# Patient Record
Sex: FEMALE | Race: WHITE | NOT HISPANIC OR LATINO | Employment: FULL TIME | URBAN - METROPOLITAN AREA
[De-identification: names, ages, dates, MRNs, and addresses within clinical notes are randomized per-mention and may not be internally consistent; named-entity substitution may affect disease eponyms.]

---

## 2017-01-05 ENCOUNTER — OFFICE VISIT (OUTPATIENT)
Dept: MEDICAL GROUP | Facility: MEDICAL CENTER | Age: 52
End: 2017-01-05
Payer: COMMERCIAL

## 2017-01-05 VITALS
DIASTOLIC BLOOD PRESSURE: 80 MMHG | OXYGEN SATURATION: 100 % | SYSTOLIC BLOOD PRESSURE: 122 MMHG | TEMPERATURE: 97.3 F | HEART RATE: 91 BPM | BODY MASS INDEX: 33.79 KG/M2 | WEIGHT: 179 LBS | HEIGHT: 61 IN | RESPIRATION RATE: 16 BRPM

## 2017-01-05 DIAGNOSIS — E78.5 DYSLIPIDEMIA: ICD-10-CM

## 2017-01-05 DIAGNOSIS — J45.20 MILD INTERMITTENT ASTHMA WITHOUT COMPLICATION: ICD-10-CM

## 2017-01-05 DIAGNOSIS — E03.9 HYPOTHYROIDISM (ACQUIRED): ICD-10-CM

## 2017-01-05 DIAGNOSIS — R92.8 ABNORMAL FINDING ON BREAST IMAGING: ICD-10-CM

## 2017-01-05 DIAGNOSIS — N95.1 PERIMENOPAUSAL: ICD-10-CM

## 2017-01-05 PROBLEM — J45.21 MILD INTERMITTENT ASTHMA WITH ACUTE EXACERBATION: Status: ACTIVE | Noted: 2017-01-05

## 2017-01-05 PROCEDURE — 99204 OFFICE O/P NEW MOD 45 MIN: CPT | Performed by: NURSE PRACTITIONER

## 2017-01-05 RX ORDER — AMOXICILLIN 500 MG
CAPSULE ORAL
Status: ON HOLD | COMMUNITY
End: 2019-10-02

## 2017-01-05 RX ORDER — MONTELUKAST SODIUM 10 MG/1
10 TABLET ORAL DAILY
COMMUNITY
End: 2017-10-11 | Stop reason: SDUPTHER

## 2017-01-05 RX ORDER — CALCIUM CARBONATE 300MG(750)
TABLET,CHEWABLE ORAL
COMMUNITY
End: 2019-09-13

## 2017-01-05 RX ORDER — LEVOTHYROXINE SODIUM 0.1 MG/1
100 TABLET ORAL
COMMUNITY
End: 2017-05-16 | Stop reason: SDUPTHER

## 2017-01-05 ASSESSMENT — PATIENT HEALTH QUESTIONNAIRE - PHQ9: CLINICAL INTERPRETATION OF PHQ2 SCORE: 0

## 2017-01-05 NOTE — Clinical Note
[facility]  MARGIE Ceja  [address]  [place] [address]  Fax: [phone] Authorization for Release/Disclosure of Protected Health Information   Name: ESTER ADEN : 1965 SSN: [id number]   Address: [place] [address] Phone:    [phone] (home) [phone] (work)   I authorize the entity listed below to release/disclose the PHI below to [facility]/YESENIA Ceja.   Provider or Entity Name:  [facility]     Address   City, State, [facility]   Phone:      Fax:     Reason for request: continuity of care   Information to be released:    [  ] LAST COLONOSCOPY, including any PATH REPORT [  ] LAST DEXA  [  ] LAST MAMMOGRAM  [  ] LAST PAP [  ] RETINA EXAM REPORT  [  ] IMMUNIZATION RECORDS  [  ] Release all info      [  ] Check here and initial the line next to each item to release ALL health information INCLUDING  _____ Care and treatment for drug and / or alcohol abuse  _____ HIV testing, infection status, or AIDS  _____ Genetic Testing    DATES OF SERVICE OR TIME PERIOD TO BE DISCLOSED: _____________  I understand and acknowledge that:  * This Authorization may be revoked at any time by you in writing, except if your health information has already been used or disclosed.  * Your health information that will be used or disclosed as a result of you signing this authorization could be re-disclosed by the recipient. If this occurs, your re-disclosed health information may no longer be protected by State or Federal laws.  * You may refuse to sign this Authorization. Your refusal will not affect your ability to obtain treatment.  * This Authorization becomes effective upon signing and will  on (date) __________. If no date is indicated, this Authorization will  one (1) year from the signature date.    Name: Ester Aden    Signature:     Date: 2017

## 2017-01-05 NOTE — MR AVS SNAPSHOT
"        Ester Garnerler   2017 4:20 PM   Office Visit   MRN: [id number]    Department:  [facility]   Dept Phone:  [phone]    Description:  Female : 1965   Provider:  MARGIE Ceja           Reason for Visit     Establish Care           Allergies as of 2017     Not on File      You were diagnosed with     Abnormal finding on breast imaging   [809763]       Hypothyroidism (acquired)   [718273]       Perimenopausal   [186168]       Mild intermittent asthma with acute exacerbation   [761122]       Dyslipidemia   [944981]         Vital Signs     Blood Pressure Pulse Temperature Respirations Height Weight    122/80 mmHg 91 36.3 °C (97.3 °F) 16 1.549 m (5' 0.98\") 81.194 kg (179 lb)    Body Mass Index Oxygen Saturation Smoking Status             33.84 kg/m2 100% Never Smoker          Basic Information     Date Of Birth Sex Race Ethnicity Preferred Language    1965 Female White Non- English      Problem List              ICD-10-CM Priority Class Noted - Resolved    Hypothyroidism (acquired) E03.9   2017 - Present    Mild intermittent asthma with acute exacerbation J45.21   2017 - Present      Health Maintenance        Date Due Completion Dates    IMM DTaP/Tdap/Td Vaccine (1 - Tdap) 1983 ---    PAP SMEAR 1985 ---    COLONOSCOPY 2014 ---    IMM INFLUENZA (1) 2016 ---    MAMMOGRAM 2016, 4/10/2014, 4/10/2014, 3/1/2013, 2012, 2011, 2008, 2008, 8/10/2007, 8/10/2007, 2006, 3/25/2005, 3/15/2004            Current Immunizations     No immunizations on file.      Below and/or attached are the medications your provider expects you to take. Review all of your home medications and newly ordered medications with your provider and/or pharmacist. Follow medication instructions as directed by your provider and/or pharmacist. Please keep your medication list with you and share with your provider. Update the information when " medications are discontinued, doses are changed, or new medications (including over-the-counter products) are added; and carry medication information at all times in the event of emergency situations     Allergies:  (Not on file)          Medications  Valid as of: January 05, 2017 -  5:25 PM    Generic Name Brand Name Tablet Size Instructions for use    Calcium Carb-Cholecalciferol   Take  by mouth.        Levothyroxine Sodium (Tab) SYNTHROID 100 MCG Take 100 mcg by mouth Every morning on an empty stomach.        Magnesium (Tab) Magnesium 400 MG Take  by mouth.        Montelukast Sodium (Tab) SINGULAIR 10 MG Take 10 mg by mouth every day.        Multiple Vitamins-Minerals   Take  by mouth.        Omega-3 Fatty Acids (Cap) Fish Oil 1200 MG Take  by mouth.        .                 Medicines prescribed today were sent to:     None      Medication refill instructions:       If your prescription bottle indicates you have medication refills left, it is not necessary to call your provider’s office. Please contact your pharmacy and they will refill your medication.    If your prescription bottle indicates you do not have any refills left, you may request refills at any time through one of the following ways: The online [email or web address] system (except Urgent Care), by calling your provider’s office, or by asking your pharmacy to contact your provider’s office with a refill request. Medication refills are processed only during regular business hours and may not be available until the next business day. Your provider may request additional information or to have a follow-up visit with you prior to refilling your medication.   *Please Note: Medication refills are assigned a new Rx number when refilled electronically. Your pharmacy may indicate that no refills were authorized even though a new prescription for the same medication is available at the pharmacy. Please request the medicine by name with the pharmacy before contacting your  provider for a refill.        Your To Do List     Future Labs/Procedures Complete By Expires    COMP METABOLIC PANEL  As directed 1/6/2018    FREE THYROXINE  As directed 1/6/2018    LIPID PROFILE  As directed 1/6/2018    MA-DIAGNOSTIC MAMMO W/CAD-BILAT  As directed 1/5/2018    THYROID PEROXIDASE  (TPO) AB  As directed 1/6/2018    TRIIDOTHYRONINE  As directed 1/5/2018    TSH  As directed 1/6/2018    US-BREAST COMPLETE-RIGHT  As directed 1/5/2018         [employer] Access Code: Activation code not generated  Current [employer] Status: Active

## 2017-01-05 NOTE — Clinical Note
[facility]  MARGIE Ceja  [address]  [place] [address]  Fax: [phone] Authorization for Release/Disclosure of Protected Health Information   Name: ESTER SERRANO : 1965 SSN: [id number]   Address: [place] [id number] Phone:    [phone] (home) [phone] (work)   I authorize the entity listed below to release/disclose the PHI below to [facility]/YESENIA Ceja.   Provider or Entity Name: Elin Gorman/ Dr Mccarthy/ Marlyn Bryant       Address   City, [facility] Phone:      Fax:     Reason for request: continuity of care   Information to be released:    [  ] LAST COLONOSCOPY, including any PATH REPORT [  ] LAST DEXA  [  ] LAST MAMMOGRAM  [  ] LAST PAP [  ] RETINA EXAM REPORT  [  ] IMMUNIZATION RECORDS  [  ] Release all info      [  ] Check here and initial the line next to each item to release ALL health information INCLUDING  _____ Care and treatment for drug and / or alcohol abuse  _____ HIV testing, infection status, or AIDS  _____ Genetic Testing    DATES OF SERVICE OR TIME PERIOD TO BE DISCLOSED: _____________  I understand and acknowledge that:  * This Authorization may be revoked at any time by you in writing, except if your health information has already been used or disclosed.  * Your health information that will be used or disclosed as a result of you signing this authorization could be re-disclosed by the recipient. If this occurs, your re-disclosed health information may no longer be protected by State or Federal laws.  * You may refuse to sign this Authorization. Your refusal will not affect your ability to obtain treatment.  * This Authorization becomes effective upon signing and will  on (date) __________. If no date is indicated, this Authorization will  one (1) year from the signature date.    Name: Ester Serrano    Signature:     Date: 2017

## 2017-01-06 PROBLEM — E78.5 DYSLIPIDEMIA: Status: ACTIVE | Noted: 2017-01-06

## 2017-01-06 PROBLEM — N95.1 PERIMENOPAUSAL: Status: ACTIVE | Noted: 2017-01-06

## 2017-01-06 PROBLEM — J45.20 MILD INTERMITTENT ASTHMA WITHOUT COMPLICATION: Status: ACTIVE | Noted: 2017-01-05

## 2017-01-06 NOTE — ASSESSMENT & PLAN NOTE
Menses about every 2-3 weeks  Moderate bleeding, no significant dysmenorrhea  She is having difficulty with mood swings, tearfulness, irritability

## 2017-01-06 NOTE — ASSESSMENT & PLAN NOTE
Diagnosed 20 years ago  Managed with levothyroxine 100 mcg daily  About 30 lb weight gain in the last few months with no change in diet or exercise  Having some mood swings, lower mood than normal

## 2017-01-13 ENCOUNTER — HOSPITAL ENCOUNTER (OUTPATIENT)
Dept: LAB | Facility: MEDICAL CENTER | Age: 52
End: 2017-01-13
Attending: NURSE PRACTITIONER
Payer: COMMERCIAL

## 2017-01-13 DIAGNOSIS — E78.5 DYSLIPIDEMIA: ICD-10-CM

## 2017-01-13 DIAGNOSIS — E03.9 HYPOTHYROIDISM (ACQUIRED): ICD-10-CM

## 2017-01-13 LAB
ALBUMIN SERPL BCP-MCNC: 4.2 G/DL (ref 3.2–4.9)
ALBUMIN/GLOB SERPL: 1.3 G/DL
ALP SERPL-CCNC: 44 U/L (ref 30–99)
ALT SERPL-CCNC: 18 U/L (ref 2–50)
ANION GAP SERPL CALC-SCNC: 9 MMOL/L (ref 0–11.9)
AST SERPL-CCNC: 18 U/L (ref 12–45)
BILIRUB SERPL-MCNC: 0.6 MG/DL (ref 0.1–1.5)
BUN SERPL-MCNC: 9 MG/DL (ref 8–22)
CALCIUM SERPL-MCNC: 9.2 MG/DL (ref 8.5–10.5)
CHLORIDE SERPL-SCNC: 105 MMOL/L (ref 96–112)
CHOLEST SERPL-MCNC: 213 MG/DL (ref 100–199)
CO2 SERPL-SCNC: 25 MMOL/L (ref 20–33)
CREAT SERPL-MCNC: 0.8 MG/DL (ref 0.5–1.4)
GLOBULIN SER CALC-MCNC: 3.3 G/DL (ref 1.9–3.5)
GLUCOSE SERPL-MCNC: 82 MG/DL (ref 65–99)
HDLC SERPL-MCNC: 66 MG/DL
LDLC SERPL CALC-MCNC: 122 MG/DL
POTASSIUM SERPL-SCNC: 3.8 MMOL/L (ref 3.6–5.5)
PROT SERPL-MCNC: 7.5 G/DL (ref 6–8.2)
SODIUM SERPL-SCNC: 139 MMOL/L (ref 135–145)
T3 SERPL-MCNC: 100.9 NG/DL (ref 60–181)
T4 FREE SERPL-MCNC: 1.04 NG/DL (ref 0.53–1.43)
THYROPEROXIDASE AB SERPL-ACNC: 95.4 IU/ML (ref 0–9)
TRIGL SERPL-MCNC: 125 MG/DL (ref 0–149)
TSH SERPL DL<=0.005 MIU/L-ACNC: 2.13 UIU/ML (ref 0.3–3.7)

## 2017-01-13 PROCEDURE — 84439 ASSAY OF FREE THYROXINE: CPT

## 2017-01-13 PROCEDURE — 86376 MICROSOMAL ANTIBODY EACH: CPT

## 2017-01-13 PROCEDURE — 84480 ASSAY TRIIODOTHYRONINE (T3): CPT

## 2017-01-13 PROCEDURE — 84443 ASSAY THYROID STIM HORMONE: CPT

## 2017-01-13 PROCEDURE — 80061 LIPID PANEL: CPT

## 2017-01-13 PROCEDURE — 36415 COLL VENOUS BLD VENIPUNCTURE: CPT

## 2017-01-13 PROCEDURE — 80053 COMPREHEN METABOLIC PANEL: CPT

## 2017-02-10 ENCOUNTER — HOSPITAL ENCOUNTER (OUTPATIENT)
Dept: RADIOLOGY | Facility: MEDICAL CENTER | Age: 52
End: 2017-02-10
Attending: NURSE PRACTITIONER
Payer: COMMERCIAL

## 2017-02-10 DIAGNOSIS — R92.8 ABNORMAL FINDING ON BREAST IMAGING: ICD-10-CM

## 2017-02-10 PROCEDURE — 77063 BREAST TOMOSYNTHESIS BI: CPT

## 2017-05-15 ENCOUNTER — PATIENT MESSAGE (OUTPATIENT)
Dept: MEDICAL GROUP | Facility: MEDICAL CENTER | Age: 52
End: 2017-05-15

## 2017-05-15 DIAGNOSIS — R92.8 ABNORMAL FINDING ON BREAST IMAGING: ICD-10-CM

## 2017-05-16 RX ORDER — LEVOTHYROXINE SODIUM 0.1 MG/1
100 TABLET ORAL
Qty: 90 TAB | Refills: 3 | Status: SHIPPED | OUTPATIENT
Start: 2017-05-16 | End: 2018-05-11 | Stop reason: SDUPTHER

## 2017-05-16 NOTE — TELEPHONE ENCOUNTER
From: Ester Aden  To: MARGIE Ceja  Sent: 5/15/2017 5:59 AM PDT  Subject: Prescription Question    Dr. Kate,    As I thought in January I had about 6 months of the Synthroid 100mcg tabs/or generic medication left. I am now need a refill. Would you please call in an order.     My mail order pharmacy is [facility] [phone].    Thank you,  Ester Aden

## 2017-10-11 ENCOUNTER — PATIENT MESSAGE (OUTPATIENT)
Dept: MEDICAL GROUP | Facility: MEDICAL CENTER | Age: 52
End: 2017-10-11

## 2017-10-11 DIAGNOSIS — R92.8 ABNORMAL FINDING ON BREAST IMAGING: ICD-10-CM

## 2017-10-11 RX ORDER — MONTELUKAST SODIUM 10 MG/1
10 TABLET ORAL DAILY
Qty: 90 TAB | Refills: 3 | Status: SHIPPED | OUTPATIENT
Start: 2017-10-11 | End: 2018-10-07 | Stop reason: SDUPTHER

## 2017-10-11 NOTE — TELEPHONE ENCOUNTER
From: Ester Aden  To: MARGIE Ceja  Sent: 10/11/2017 6:29 AM PDT  Subject: Prescription Question    Good morning Isha,    Would you please refill through [facility] my prescription of Singulair/Montelukast Sod Tabs 10mg.     I didn't ask for a refill last January because I had several months supply which I now have 2 weeks left.     Thanks,  Ester Aden

## 2018-01-09 ENCOUNTER — HOSPITAL ENCOUNTER (OUTPATIENT)
Facility: MEDICAL CENTER | Age: 53
End: 2018-01-09
Attending: NURSE PRACTITIONER
Payer: COMMERCIAL

## 2018-01-09 PROCEDURE — 87624 HPV HI-RISK TYP POOLED RSLT: CPT

## 2018-01-09 PROCEDURE — 88175 CYTOPATH C/V AUTO FLUID REDO: CPT

## 2018-01-12 LAB
CYTOLOGY REG CYTOL: NORMAL
HPV HR 12 DNA CVX QL NAA+PROBE: NEGATIVE
HPV16 DNA SPEC QL NAA+PROBE: NEGATIVE
HPV18 DNA SPEC QL NAA+PROBE: NEGATIVE
SPECIMEN SOURCE: NORMAL

## 2018-01-26 ENCOUNTER — HOSPITAL ENCOUNTER (OUTPATIENT)
Dept: RADIOLOGY | Facility: MEDICAL CENTER | Age: 53
End: 2018-01-26
Attending: NURSE PRACTITIONER
Payer: COMMERCIAL

## 2018-01-26 DIAGNOSIS — N85.2 HYPERTROPHY OF UTERUS: ICD-10-CM

## 2018-01-26 DIAGNOSIS — D25.9 UTERINE LEIOMYOMA, UNSPECIFIED LOCATION: ICD-10-CM

## 2018-01-26 DIAGNOSIS — N92.0 EXCESSIVE OR FREQUENT MENSTRUATION: ICD-10-CM

## 2018-01-26 PROCEDURE — 76830 TRANSVAGINAL US NON-OB: CPT

## 2018-02-02 ENCOUNTER — OFFICE VISIT (OUTPATIENT)
Dept: MEDICAL GROUP | Facility: MEDICAL CENTER | Age: 53
End: 2018-02-02
Payer: COMMERCIAL

## 2018-02-02 VITALS
DIASTOLIC BLOOD PRESSURE: 90 MMHG | BODY MASS INDEX: 35.12 KG/M2 | SYSTOLIC BLOOD PRESSURE: 128 MMHG | OXYGEN SATURATION: 97 % | WEIGHT: 186 LBS | RESPIRATION RATE: 16 BRPM | HEART RATE: 80 BPM | HEIGHT: 61 IN | TEMPERATURE: 97.8 F

## 2018-02-02 DIAGNOSIS — J45.20 MILD INTERMITTENT ASTHMA WITHOUT COMPLICATION: ICD-10-CM

## 2018-02-02 DIAGNOSIS — Z12.31 ENCOUNTER FOR SCREENING MAMMOGRAM FOR BREAST CANCER: ICD-10-CM

## 2018-02-02 DIAGNOSIS — D25.9 UTERINE LEIOMYOMA, UNSPECIFIED LOCATION: ICD-10-CM

## 2018-02-02 DIAGNOSIS — E66.9 OBESITY (BMI 30-39.9): ICD-10-CM

## 2018-02-02 DIAGNOSIS — Z00.00 ANNUAL PHYSICAL EXAM: ICD-10-CM

## 2018-02-02 DIAGNOSIS — E06.3 HYPOTHYROIDISM DUE TO HASHIMOTO'S THYROIDITIS: ICD-10-CM

## 2018-02-02 DIAGNOSIS — E03.8 HYPOTHYROIDISM DUE TO HASHIMOTO'S THYROIDITIS: ICD-10-CM

## 2018-02-02 DIAGNOSIS — E78.00 PURE HYPERCHOLESTEROLEMIA: ICD-10-CM

## 2018-02-02 DIAGNOSIS — R06.83 SNORING: ICD-10-CM

## 2018-02-02 PROBLEM — E78.5 DYSLIPIDEMIA: Status: RESOLVED | Noted: 2017-01-06 | Resolved: 2018-02-02

## 2018-02-02 PROBLEM — E03.9 HYPOTHYROIDISM (ACQUIRED): Status: RESOLVED | Noted: 2017-01-05 | Resolved: 2018-02-02

## 2018-02-02 PROCEDURE — 99396 PREV VISIT EST AGE 40-64: CPT | Mod: 25 | Performed by: NURSE PRACTITIONER

## 2018-02-02 PROCEDURE — 90732 PPSV23 VACC 2 YRS+ SUBQ/IM: CPT | Performed by: NURSE PRACTITIONER

## 2018-02-02 PROCEDURE — 90471 IMMUNIZATION ADMIN: CPT | Performed by: NURSE PRACTITIONER

## 2018-02-02 RX ORDER — CHOLECALCIFEROL (VITAMIN D3) 25 MCG
TABLET,CHEWABLE ORAL
COMMUNITY
End: 2019-09-13

## 2018-02-02 ASSESSMENT — PATIENT HEALTH QUESTIONNAIRE - PHQ9: CLINICAL INTERPRETATION OF PHQ2 SCORE: 0

## 2018-02-02 NOTE — ASSESSMENT & PLAN NOTE
Managed with levothyroxine 100 mcg daily  Gaining weight in the last year  Energy level is ok  Some occ constipation, hair loss, dry skin

## 2018-02-02 NOTE — ASSESSMENT & PLAN NOTE
Gradual weight gain over the last year despite regular exercise. Interested in referral to weight loss program
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
## 2018-02-02 NOTE — ASSESSMENT & PLAN NOTE
"Significant snoring reported by , pauses in breathing, frequent waking  Fatigued during the day, sometimes feeling \"foggy\"  "

## 2018-02-02 NOTE — ASSESSMENT & PLAN NOTE
Uterine enlarged on recent screening test for work. Was seen by her ob/gyn for pap, they ordered an ultrasound which showed multiple thyroid

## 2018-02-08 ENCOUNTER — PATIENT MESSAGE (OUTPATIENT)
Dept: MEDICAL GROUP | Facility: MEDICAL CENTER | Age: 53
End: 2018-02-08

## 2018-02-08 DIAGNOSIS — Z13.21 ENCOUNTER FOR VITAMIN DEFICIENCY SCREENING: ICD-10-CM

## 2018-02-08 NOTE — TELEPHONE ENCOUNTER
From: Ester Aden  To: MARGIE Ceja  Sent: 2/8/2018 10:53 AM PST  Subject: Test Result Question    Deshaun Vieira,    I guess it's not a test result question but an addition question.     I was talking with coworkers about Vitamin D and they asked if I've had my levels tested. Would it be possible to add that to my labs? I am having the thyroid ultrasound on Monday and plan to do my labs then too.     Also, I confirmed with [facility] that I had a Tdap vaccination on 10/17/2011. How do I log that into [employer]?    Thank you,  Ester Aden

## 2018-02-12 ENCOUNTER — HOSPITAL ENCOUNTER (OUTPATIENT)
Dept: RADIOLOGY | Facility: MEDICAL CENTER | Age: 53
End: 2018-02-12
Attending: NURSE PRACTITIONER
Payer: COMMERCIAL

## 2018-02-12 ENCOUNTER — HOSPITAL ENCOUNTER (OUTPATIENT)
Dept: LAB | Facility: MEDICAL CENTER | Age: 53
End: 2018-02-12
Attending: NURSE PRACTITIONER
Payer: COMMERCIAL

## 2018-02-12 DIAGNOSIS — E78.00 PURE HYPERCHOLESTEROLEMIA: ICD-10-CM

## 2018-02-12 DIAGNOSIS — E06.3 HYPOTHYROIDISM DUE TO HASHIMOTO'S THYROIDITIS: ICD-10-CM

## 2018-02-12 DIAGNOSIS — E03.8 HYPOTHYROIDISM DUE TO HASHIMOTO'S THYROIDITIS: ICD-10-CM

## 2018-02-12 DIAGNOSIS — Z13.21 ENCOUNTER FOR VITAMIN DEFICIENCY SCREENING: ICD-10-CM

## 2018-02-12 DIAGNOSIS — Z00.00 ANNUAL PHYSICAL EXAM: ICD-10-CM

## 2018-02-12 LAB
25(OH)D3 SERPL-MCNC: 20 NG/ML (ref 30–100)
ALBUMIN SERPL BCP-MCNC: 4 G/DL (ref 3.2–4.9)
ALBUMIN/GLOB SERPL: 1.4 G/DL
ALP SERPL-CCNC: 48 U/L (ref 30–99)
ALT SERPL-CCNC: 23 U/L (ref 2–50)
ANION GAP SERPL CALC-SCNC: 9 MMOL/L (ref 0–11.9)
AST SERPL-CCNC: 23 U/L (ref 12–45)
BILIRUB SERPL-MCNC: 0.5 MG/DL (ref 0.1–1.5)
BUN SERPL-MCNC: 11 MG/DL (ref 8–22)
CALCIUM SERPL-MCNC: 9 MG/DL (ref 8.5–10.5)
CHLORIDE SERPL-SCNC: 104 MMOL/L (ref 96–112)
CHOLEST SERPL-MCNC: 191 MG/DL (ref 100–199)
CO2 SERPL-SCNC: 25 MMOL/L (ref 20–33)
CREAT SERPL-MCNC: 0.9 MG/DL (ref 0.5–1.4)
GLOBULIN SER CALC-MCNC: 2.9 G/DL (ref 1.9–3.5)
GLUCOSE SERPL-MCNC: 101 MG/DL (ref 65–99)
HDLC SERPL-MCNC: 58 MG/DL
LDLC SERPL CALC-MCNC: 109 MG/DL
POTASSIUM SERPL-SCNC: 3.9 MMOL/L (ref 3.6–5.5)
PROT SERPL-MCNC: 6.9 G/DL (ref 6–8.2)
SODIUM SERPL-SCNC: 138 MMOL/L (ref 135–145)
T3 SERPL-MCNC: 90.5 NG/DL (ref 60–181)
T4 FREE SERPL-MCNC: 1.03 NG/DL (ref 0.53–1.43)
THYROPEROXIDASE AB SERPL-ACNC: 70.3 IU/ML (ref 0–9)
TRIGL SERPL-MCNC: 121 MG/DL (ref 0–149)
TSH SERPL DL<=0.005 MIU/L-ACNC: 0.76 UIU/ML (ref 0.38–5.33)

## 2018-02-12 PROCEDURE — 84480 ASSAY TRIIODOTHYRONINE (T3): CPT

## 2018-02-12 PROCEDURE — 36415 COLL VENOUS BLD VENIPUNCTURE: CPT

## 2018-02-12 PROCEDURE — 86376 MICROSOMAL ANTIBODY EACH: CPT

## 2018-02-12 PROCEDURE — 80061 LIPID PANEL: CPT

## 2018-02-12 PROCEDURE — 84439 ASSAY OF FREE THYROXINE: CPT

## 2018-02-12 PROCEDURE — 76536 US EXAM OF HEAD AND NECK: CPT

## 2018-02-12 PROCEDURE — 80053 COMPREHEN METABOLIC PANEL: CPT

## 2018-02-12 PROCEDURE — 82306 VITAMIN D 25 HYDROXY: CPT

## 2018-02-12 PROCEDURE — 84482 T3 REVERSE: CPT

## 2018-02-12 PROCEDURE — 84443 ASSAY THYROID STIM HORMONE: CPT

## 2018-02-14 ENCOUNTER — PATIENT MESSAGE (OUTPATIENT)
Dept: MEDICAL GROUP | Facility: MEDICAL CENTER | Age: 53
End: 2018-02-14

## 2018-02-14 NOTE — TELEPHONE ENCOUNTER
From: Ester Aden  To: MARGIE Ceja  Sent: 2/14/2018 10:52 AM PST  Subject: Test Result Question    Perfect I will  some vitamin D3.     I'm more concerned about food sensitivities associated with the Hashimotos. There are too many things out there saying to avoid certain foods and I just want to either be tested for allergies/sensitivities or to know what to avoid. I am going gluten free. So that should help.     Ester Aden   ----- Message -----  From: MARGIE Ceja  Sent: 2/14/18, 10:39 AM  To: Ester Aden  Subject: RE: Test Result Question    Here is the phone number for the weight management program:  [facility]  [phone]  However, there is a note by the referral department that this is not a covered benefit with your insurance. I'm not sure what they discharge but you could get more information by calling them.  Patient on 2000 international units of vitamin D3 to what you are already taking  I would defer to your OB/GYN regarding the hormonal concern.  What is the food concern?  Isha HANDY      ----- Message -----   From: Ester Aden   Sent: 2/14/2018 10:25 AM PST   To: MARGIE Ceja  Subject: Test Result Question    I have a question about VITAMIN D,25 HYDROXY resulted on 2/12/18, 1:58 PM.    I am only taking what is included in my calcium, 800 IU I believe.     What additional should I be taking?    Thank you! The rest of the test results look good. My TSH went way down even though still normal.     I will be doing the sleep study March 1st.     I haven't heard from the weight management yet.     I'm wondering if I should have my hormones checked? And or possible food sensitivities.     Have a great day,  Ester Aden

## 2018-02-15 LAB — T3REVERSE SERPL-MCNC: 19.6 NG/DL (ref 9–27)

## 2018-02-16 ENCOUNTER — HOSPITAL ENCOUNTER (OUTPATIENT)
Dept: RADIOLOGY | Facility: MEDICAL CENTER | Age: 53
End: 2018-02-16
Attending: NURSE PRACTITIONER
Payer: COMMERCIAL

## 2018-02-16 DIAGNOSIS — Z12.31 SCREENING MAMMOGRAM, ENCOUNTER FOR: ICD-10-CM

## 2018-02-16 PROCEDURE — 77063 BREAST TOMOSYNTHESIS BI: CPT

## 2018-03-06 ENCOUNTER — PATIENT MESSAGE (OUTPATIENT)
Dept: MEDICAL GROUP | Facility: MEDICAL CENTER | Age: 53
End: 2018-03-06

## 2018-03-13 ENCOUNTER — NON-PROVIDER VISIT (OUTPATIENT)
Dept: HEALTH INFORMATION MANAGEMENT | Facility: MEDICAL CENTER | Age: 53
End: 2018-03-13
Payer: COMMERCIAL

## 2018-03-13 VITALS
BODY MASS INDEX: 32.51 KG/M2 | HEART RATE: 64 BPM | HEIGHT: 61 IN | SYSTOLIC BLOOD PRESSURE: 112 MMHG | TEMPERATURE: 99.5 F | WEIGHT: 172.2 LBS | OXYGEN SATURATION: 98 % | DIASTOLIC BLOOD PRESSURE: 70 MMHG

## 2018-03-13 DIAGNOSIS — E66.9 OBESITY (BMI 30-39.9): ICD-10-CM

## 2018-03-13 DIAGNOSIS — E78.00 PURE HYPERCHOLESTEROLEMIA: ICD-10-CM

## 2018-03-13 DIAGNOSIS — E06.3 HYPOTHYROIDISM DUE TO HASHIMOTO'S THYROIDITIS: ICD-10-CM

## 2018-03-13 DIAGNOSIS — E03.8 HYPOTHYROIDISM DUE TO HASHIMOTO'S THYROIDITIS: ICD-10-CM

## 2018-03-13 PROCEDURE — 99205 OFFICE O/P NEW HI 60 MIN: CPT | Mod: 25 | Performed by: INTERNAL MEDICINE

## 2018-03-13 PROCEDURE — 93000 ELECTROCARDIOGRAM COMPLETE: CPT | Performed by: INTERNAL MEDICINE

## 2018-03-13 PROCEDURE — 97802 MEDICAL NUTRITION INDIV IN: CPT | Performed by: DIETITIAN, REGISTERED

## 2018-03-13 NOTE — PATIENT INSTRUCTIONS
Track intake with My Fitness Pal  Keep kcal < 1400 per day to start  Keep total carb intake < 100 g per day, keep total protein > 100 g per day  64+ oz water per day  Keep current exercise 3 days per week at gym

## 2018-03-13 NOTE — PROGRESS NOTES
Bariatric Medicine H&P  Chief Complaint   Patient presents with   • Weight Gain       Referred by:  Marylou Kate A.P.R.N.    History of Present Illness:   Ester Aden is a 52 y.o.  female who presents for weight management and to help address co-morbidities related to overweight, including hypercholesterolemia, vitamin D deficiency.    The patient presents frustrated by ongoing obesity. Several years ago, she gained 30 pounds and has not been able to lose it. She was diagnosed with Hashimoto's thyroiditis, has had her thyroid disease managed by her primary care provider, has been stable. She was told avoiding gluten may be helpful to her thyroiditis, does not know what to eat and what foods to avoid.    She tried Jessica Mirza many years ago, had gradual loss, has not tried a formal weight loss program since. Does not like package foods and is not interested in protein shake meal replacements at this time. Wants to make changes using whole food. Not interested in weight loss medications at this time. Is willing to track intake.    Current intake includes a protein shake for breakfast during the week which she makes or cereal or oatmeal. Her protein smoothie includes spinach, a cup of almond milk, 20 g of protein powder, tropical fruit one cup with some berries. For lunch, she has a salad, meat and cheese with chips and fruit or leftovers, dinner of beef or chicken with potato and vegetable. She snacks a lot on crackers, chips, dried fruit, Nutella before dinner, yogurt, popcorn. She mostly drinks coffee, tea, and water. Drinks at least 64+ ounces of water per day. She mostly eats at her desk or in front of the TV at home.    Is on vitamin D repletion. Not requiring a statin at this time.      Behavior-Related History:  Binge eating screen: Negative       Exercise:   Joined a local gym in December, takes 2 group classes per week, one day free gym time  Stands at her desk 80% of the time    Life Style  "Changes:  Moved to [place] from [place] last year, still commutes to [place] for work     Review of Systems   Fatigue, constipation, sleepy during waking hours at times and insomnia.  Sleep apnea screen: Negative, does snore but had negative home sleep study last month  All other ROS were reviewed with patient today and are negative.      PMH/PSH:  I have reviewed the patient's medical, social and family history, allergies, and medications today.  Prior records reviewed.  FHx Obesity: Positive  Personal Hx of Bariatric Surgery: No  Works administrative desk job for the [employer]      Physical Exam:   /70   Pulse 64   Temp 37.5 °C (99.5 °F)   Ht 1.549 m (5' 1\")   Wt 78.1 kg (172 lb 3.2 oz)   SpO2 98%   BMI 32.54 kg/m²   Waist: 37.5 in  Body fat % 43  REE 1480 kcal/day    Constitutional: Oriented to person, place, and time and well-developed, well-nourished, and in no distress.    HENT: No facial plethora.  No Cushingoid features.  No scalloped tongue.  No dental erosions.  No swollen parotids.  Head: Normocephalic.   Eyes: EOM are normal. Pupils are equal, round, and reactive to light. No periorbital edema.  No lateral thinning of eyebrows.  No vertical nystagmus.  Neck: Normal range of motion. Neck supple. No thyromegaly present. No buffalo hump.  Cardiovascular: Normal rate and regular rhythm.  No murmur heard.  Pulmonary/Chest: Effort normal and breath sounds normal. No wheezes.   Abdominal: Soft. Bowel sounds are normal. No pannus.  No ascites.  No hepatosplenomegaly.  No red striae.  Musculoskeletal: Normal range of motion. No edema.   Neurological: Alert and oriented to person, place, and time. Normal reflexes. No cranial nerve deficit. No muscle weakness.  Gait normal.   Skin: Warm and dry. Not diaphoretic. No hirsuitism.  No acanthosis nigricans.  Not excessively dry, scaly.  No acne.  No bruising/ecchymosis.  No hyperpigmentation.  No xanthomas or acrochordon.  No violaceous striae.  " No keratosis pilaris.  Psychiatric: Mood, memory, affect and judgment normal.  Tearful at times.    Laboratory:   Prior labs reviewed. 2/12/18 glucose 101, , vitamin D 20  EKG: Sinus, rate 57, no significant ST-T abnormalities, corrected QT 0.386  Ordered and reviewed by me today.    Dietitian Assessment: I have reviewed the Dietitian's assessment related to this encounter.       ASSESSMENT/PLAN:  Body mass index is 32.54 kg/m².   Obesity Stage ([place]) 1; Class 1    1. Obesity (BMI 30-39.9)  EKG   2. Pure hypercholesterolemia  EKG   3. Hypothyroidism due to Hashimoto's thyroiditis       The patient will benefit from reducing her calorie and carbohydrate intake. I suspect her calorie intake is too high, compared to her resting energy expenditure. She also snacks quite a bit on refined carbohydrates, which is worsening her cholesterol and is pro-inflammatory in terms of her thyroiditis. I'm not sure the patient has a gluten intolerance based on her history, but reducing refined carbohydrates significantly will reduce her gluten intake markedly.    The patient and I have discussed at length and agree to the following recommendations, which are all addressing the above diagnoses:    Weight Goal: 5% wt loss at one month after start (pt goal weight is 140 lb)  Diet:   Track intake with My Fitness Pal  Keep kcal < 1400 per day to start  Keep total carb intake < 100 g per day, keep total protein > 100 g per day  64+ oz water per day  Reduce contents of protein shake as in line with the above recommendations  Physical Activity: Continue current 3 days per week at the gym  Risk level for moderate/vigorous exercise program: Low  New Rx: None.  Side Effects: Will review consent if applicable.  Behavior change: Mindful eating, tracking  Follow-up: 2 weeks    Face to face time spent 60 minutes,  with >50% of time devoted to one on one counseling on weight management issues, as documented above.      Thank you for your  referral!

## 2018-03-13 NOTE — PROGRESS NOTES
"3/13/2018   Referring Provider: MARGIE Ceja       Time in/out:  9:00-9:30am     Anthropometrics/Objective  Today's weight: 172.2lb  Height: 5'1\"  BMI: 32.54   Stated Goal Weight: 140lb  See comprehensive patient history form for further information     Subjective:  Pt is here today for the initial screening visit for the medical weight management program.   She is using a protein shake most mornings or for lunch.   Shake: 1 scoop protein powder, spinach, 1 cup almond milk , 1 cup frozen fruit   Has used My fitness pal in the past   Exercises 3 days per week after work (group classes)   Weekends are difficult- usually skips a meal on weekends   Pt has been told to follow a gluten free diet but has found it difficult and overwhelming. Especially when cooking for her kids / family.     See Medical Questionnaire for more detailed diet history     Nutrition Diagnosis (PES Statement)  · Obesity related to excessive energy intake and inadequate energy expenditure as evidenced by BMI >30      Client history:  Condition(s) associated with a diagnosis or treatment (specify) Hashimoto's thyroiditis, Hypercholesterolemia     Biochemical data, medical test and procedures  No results found for: HBA1C@  No results found for: POCGLUCOSE  Lab Results   Component Value Date/Time    CHOLSTRLTOT 191 02/12/2018 08:30 AM     (H) 02/12/2018 08:30 AM    HDL 58 02/12/2018 08:30 AM    TRIGLYCERIDE 121 02/12/2018 08:30 AM         Nutrition Intervention  Nutrition Prescription  Recommended Daily Kcals: <1500 Kcal based on REE based on REE of 1480kcal    Recommended Daily Protein: 100-110g based on ~25-30% kcal from protein     Meal Plan Recommendation   Recommend 1 meal replacement (protein shake) with 2 grocery meals and 2 snacks per day    Comprehensive Nutrition Education Instruction or training leading to in-depth nutrition related knowledge about:  Benefits to following meal plan, Combine carb, protein and fat at each " meal, Meal timing and spacing, Metabolism of carb, protein, fat, Portion control/ Plate method, and Carbs to Avoid    Handouts provided regarding topics discussed     Monitoring & Evaluation Plan    Behavioral-Environmental:  Behavior: Keep a food journal and bring to next appointment - My fitness pal     Physical activity: Continue current exercise routine     Food / Nutrient Intake:  Food intake: Follow meal plan as discussed (see above). Avoid concentrated sweets and processed carbs. Choose more whole grains, and natural sugar sources of carbohydrate.  Limit carbs to <100g per day. Follow plate method at meals and limit starch/carb portion to 1/2 cup. Reduce portion of fruit in smoothie to 1/2 cup.     Fluid intake: Consume at least 64 oz water per day. Avoid all unsweetened beverages. Limit coffee to 1 cup a day (ideally no cream or sugar). Avoid alcohol.       Physical Signs / Symptoms:  Weight change : -1-2lb per week to achieve goal weight of 140lb       Assessment Notes:  Pt is currently consuming an imbalanced macronutrient diet. She is consuming a high carb breakfast (cereal w banana) and also often consumes high glycemic carbohydrates (white potatoes, white rice). Today we discussed portion control, macronutrient balance, complex carbs vs simple sugars, and healthy snacking.   Pt will track intake in My Fitness Pal, which will be reviewed at next appt.     Follow up 2 weeks  Alsia Bernard RD, LD, CDE  [phone]

## 2018-04-03 ENCOUNTER — APPOINTMENT (OUTPATIENT)
Dept: HEALTH INFORMATION MANAGEMENT | Facility: MEDICAL CENTER | Age: 53
End: 2018-04-03
Payer: COMMERCIAL

## 2018-07-13 ENCOUNTER — APPOINTMENT (RX ONLY)
Dept: URBAN - METROPOLITAN AREA CLINIC 20 | Facility: CLINIC | Age: 53
Setting detail: DERMATOLOGY
End: 2018-07-13

## 2018-07-13 DIAGNOSIS — D22 MELANOCYTIC NEVI: ICD-10-CM

## 2018-07-13 DIAGNOSIS — L30.4 ERYTHEMA INTERTRIGO: ICD-10-CM

## 2018-07-13 DIAGNOSIS — L82.1 OTHER SEBORRHEIC KERATOSIS: ICD-10-CM

## 2018-07-13 DIAGNOSIS — L57.3 POIKILODERMA OF CIVATTE: ICD-10-CM

## 2018-07-13 DIAGNOSIS — L81.4 OTHER MELANIN HYPERPIGMENTATION: ICD-10-CM

## 2018-07-13 DIAGNOSIS — Z12.83 ENCOUNTER FOR SCREENING FOR MALIGNANT NEOPLASM OF SKIN: ICD-10-CM

## 2018-07-13 DIAGNOSIS — D485 NEOPLASM OF UNCERTAIN BEHAVIOR OF SKIN: ICD-10-CM

## 2018-07-13 PROBLEM — D22.71 MELANOCYTIC NEVI OF RIGHT LOWER LIMB, INCLUDING HIP: Status: ACTIVE | Noted: 2018-07-13

## 2018-07-13 PROBLEM — E03.9 HYPOTHYROIDISM, UNSPECIFIED: Status: ACTIVE | Noted: 2018-07-13

## 2018-07-13 PROBLEM — D22.5 MELANOCYTIC NEVI OF TRUNK: Status: ACTIVE | Noted: 2018-07-13

## 2018-07-13 PROBLEM — D22.72 MELANOCYTIC NEVI OF LEFT LOWER LIMB, INCLUDING HIP: Status: ACTIVE | Noted: 2018-07-13

## 2018-07-13 PROBLEM — D48.5 NEOPLASM OF UNCERTAIN BEHAVIOR OF SKIN: Status: ACTIVE | Noted: 2018-07-13

## 2018-07-13 PROBLEM — D22.39 MELANOCYTIC NEVI OF OTHER PARTS OF FACE: Status: ACTIVE | Noted: 2018-07-13

## 2018-07-13 PROCEDURE — ? COUNSELING

## 2018-07-13 PROCEDURE — ? BIOPSY BY SHAVE METHOD

## 2018-07-13 PROCEDURE — 99203 OFFICE O/P NEW LOW 30 MIN: CPT | Mod: 25

## 2018-07-13 PROCEDURE — 11100: CPT

## 2018-07-13 ASSESSMENT — LOCATION ZONE DERM
LOCATION ZONE: NECK
LOCATION ZONE: TRUNK
LOCATION ZONE: LEG
LOCATION ZONE: FACE

## 2018-07-13 ASSESSMENT — LOCATION DETAILED DESCRIPTION DERM
LOCATION DETAILED: LEFT BUTTOCK
LOCATION DETAILED: RIGHT MEDIAL BREAST 5-6:00 REGION
LOCATION DETAILED: LEFT FOREHEAD
LOCATION DETAILED: LEFT MEDIAL BREAST 7-8:00 REGION
LOCATION DETAILED: RIGHT DISTAL PRETIBIAL REGION
LOCATION DETAILED: LEFT PROXIMAL PRETIBIAL REGION
LOCATION DETAILED: RIGHT INFERIOR CENTRAL MALAR CHEEK
LOCATION DETAILED: LEFT INFERIOR ANTERIOR NECK
LOCATION DETAILED: RIGHT BUTTOCK
LOCATION DETAILED: RIGHT MID PREAURICULAR CHEEK
LOCATION DETAILED: RIGHT ANTERIOR DISTAL THIGH
LOCATION DETAILED: LEFT INFERIOR CENTRAL MALAR CHEEK

## 2018-07-13 ASSESSMENT — LOCATION SIMPLE DESCRIPTION DERM
LOCATION SIMPLE: LEFT BREAST
LOCATION SIMPLE: RIGHT CHEEK
LOCATION SIMPLE: RIGHT BUTTOCK
LOCATION SIMPLE: LEFT BUTTOCK
LOCATION SIMPLE: RIGHT PRETIBIAL REGION
LOCATION SIMPLE: LEFT ANTERIOR NECK
LOCATION SIMPLE: RIGHT BREAST
LOCATION SIMPLE: LEFT FOREHEAD
LOCATION SIMPLE: LEFT CHEEK
LOCATION SIMPLE: RIGHT THIGH
LOCATION SIMPLE: LEFT PRETIBIAL REGION

## 2018-07-13 NOTE — PROCEDURE: COUNSELING
Detail Level: Generalized
Patient Specific Counseling (Will Not Stick From Patient To Patient): Minimal today.  Recommended OTC Zeasorb AF powder.
Detail Level: Zone
Detail Level: Detailed

## 2018-07-13 NOTE — PROCEDURE: BIOPSY BY SHAVE METHOD
Render Post-Care Instructions In Note?: yes
Notification Instructions: Patient will be notified of biopsy results; however, patient is instructed to call the office if not contacted within 2 weeks.
Size Of Lesion In Cm: 0.7
Dressing: Band-Aid
Detail Level: Detailed
Wound Care: Aquaphor
Bill 08614 For Specimen Handling/Conveyance To Laboratory?: no
Hemostasis: Drysol and Electrocautery
Biopsy Type: H and E
Consent: Written and verbal consent were obtained and risks were reviewed including but not limited to scarring, infection, bleeding, scabbing, incomplete removal, nerve damage and allergy to anesthesia.
Electrodesiccation Text: The wound bed was treated with electrodesiccation after the biopsy was performed.
Depth Of Biopsy: dermis
Electrodesiccation And Curettage Text: The wound bed was treated with electrodesiccation and curettage after the biopsy was performed.
Silver Nitrate Text: The wound bed was treated with silver nitrate after the biopsy was performed.
Lab: 253
Post-Care Instructions: Keep the biopsy site dry overnight, then keep the site clean by washing with soap and water twice daily then covering with Vaseline/Aquaphor and a Band-Aid until healed.
X Size Of Lesion In Cm: 0.5
Cryotherapy Text: The wound bed was treated with cryotherapy after the biopsy was performed.
Curettage Text: The wound bed was treated with curettage after the biopsy was performed.
Billing Type: Third-Party Bill
Anesthesia Type: 1% lidocaine with epinephrine and a 1:10 solution of 8.4% sodium bicarbonate
Lab Facility: 
Additional Anesthesia Volume In Cc (Will Not Render If 0): 0
Biopsy Method: Personna blade
Type Of Destruction Used: Curettage
Anesthesia Volume In Cc: 0.1

## 2019-02-27 ENCOUNTER — HOSPITAL ENCOUNTER (OUTPATIENT)
Dept: RADIOLOGY | Facility: MEDICAL CENTER | Age: 54
End: 2019-02-27
Attending: NURSE PRACTITIONER
Payer: COMMERCIAL

## 2019-02-27 DIAGNOSIS — Z12.31 VISIT FOR SCREENING MAMMOGRAM: ICD-10-CM

## 2019-02-27 PROCEDURE — 77063 BREAST TOMOSYNTHESIS BI: CPT

## 2019-03-28 ENCOUNTER — HOSPITAL ENCOUNTER (OUTPATIENT)
Dept: LAB | Facility: MEDICAL CENTER | Age: 54
End: 2019-03-28
Attending: NURSE PRACTITIONER
Payer: COMMERCIAL

## 2019-03-28 LAB
BASOPHILS # BLD AUTO: 0.7 % (ref 0–1.8)
BASOPHILS # BLD: 0.09 K/UL (ref 0–0.12)
EOSINOPHIL # BLD AUTO: 0.35 K/UL (ref 0–0.51)
EOSINOPHIL NFR BLD: 2.7 % (ref 0–6.9)
ERYTHROCYTE [DISTWIDTH] IN BLOOD BY AUTOMATED COUNT: 51.8 FL (ref 35.9–50)
HCT VFR BLD AUTO: 40.7 % (ref 37–47)
HGB BLD-MCNC: 12.8 G/DL (ref 12–16)
IMM GRANULOCYTES # BLD AUTO: 0.03 K/UL (ref 0–0.11)
IMM GRANULOCYTES NFR BLD AUTO: 0.2 % (ref 0–0.9)
LYMPHOCYTES # BLD AUTO: 3.41 K/UL (ref 1–4.8)
LYMPHOCYTES NFR BLD: 26 % (ref 22–41)
MCH RBC QN AUTO: 30.1 PG (ref 27–33)
MCHC RBC AUTO-ENTMCNC: 31.4 G/DL (ref 33.6–35)
MCV RBC AUTO: 95.8 FL (ref 81.4–97.8)
MONOCYTES # BLD AUTO: 0.69 K/UL (ref 0–0.85)
MONOCYTES NFR BLD AUTO: 5.3 % (ref 0–13.4)
NEUTROPHILS # BLD AUTO: 8.53 K/UL (ref 2–7.15)
NEUTROPHILS NFR BLD: 65.1 % (ref 44–72)
NRBC # BLD AUTO: 0 K/UL
NRBC BLD-RTO: 0 /100 WBC
PLATELET # BLD AUTO: 370 K/UL (ref 164–446)
PMV BLD AUTO: 11.3 FL (ref 9–12.9)
RBC # BLD AUTO: 4.25 M/UL (ref 4.2–5.4)
T4 FREE SERPL-MCNC: 1.13 NG/DL (ref 0.53–1.43)
THYROPEROXIDASE AB SERPL-ACNC: 48.5 IU/ML (ref 0–9)
TSH SERPL DL<=0.005 MIU/L-ACNC: 1.71 UIU/ML (ref 0.38–5.33)
WBC # BLD AUTO: 13.1 K/UL (ref 4.8–10.8)

## 2019-03-28 PROCEDURE — 84443 ASSAY THYROID STIM HORMONE: CPT

## 2019-03-28 PROCEDURE — 86376 MICROSOMAL ANTIBODY EACH: CPT

## 2019-03-28 PROCEDURE — 85025 COMPLETE CBC W/AUTO DIFF WBC: CPT

## 2019-03-28 PROCEDURE — 36415 COLL VENOUS BLD VENIPUNCTURE: CPT

## 2019-03-28 PROCEDURE — 84439 ASSAY OF FREE THYROXINE: CPT

## 2019-04-10 ENCOUNTER — HOSPITAL ENCOUNTER (OUTPATIENT)
Dept: RADIOLOGY | Facility: MEDICAL CENTER | Age: 54
End: 2019-04-10
Attending: NURSE PRACTITIONER
Payer: COMMERCIAL

## 2019-04-10 DIAGNOSIS — N92.4 EXCESSIVE BLEEDING IN PREMENOPAUSAL PERIOD: ICD-10-CM

## 2019-04-10 PROCEDURE — 76830 TRANSVAGINAL US NON-OB: CPT

## 2019-04-18 ENCOUNTER — HOSPITAL ENCOUNTER (OUTPATIENT)
Dept: LAB | Facility: MEDICAL CENTER | Age: 54
End: 2019-04-18
Attending: NURSE PRACTITIONER
Payer: COMMERCIAL

## 2019-04-18 LAB
BASOPHILS # BLD AUTO: 0.8 % (ref 0–1.8)
BASOPHILS # BLD: 0.07 K/UL (ref 0–0.12)
EOSINOPHIL # BLD AUTO: 0.27 K/UL (ref 0–0.51)
EOSINOPHIL NFR BLD: 3.1 % (ref 0–6.9)
ERYTHROCYTE [DISTWIDTH] IN BLOOD BY AUTOMATED COUNT: 50.1 FL (ref 35.9–50)
HCT VFR BLD AUTO: 40.4 % (ref 37–47)
HGB BLD-MCNC: 12.7 G/DL (ref 12–16)
IMM GRANULOCYTES # BLD AUTO: 0.02 K/UL (ref 0–0.11)
IMM GRANULOCYTES NFR BLD AUTO: 0.2 % (ref 0–0.9)
LYMPHOCYTES # BLD AUTO: 3.53 K/UL (ref 1–4.8)
LYMPHOCYTES NFR BLD: 40.5 % (ref 22–41)
MCH RBC QN AUTO: 30.1 PG (ref 27–33)
MCHC RBC AUTO-ENTMCNC: 31.4 G/DL (ref 33.6–35)
MCV RBC AUTO: 95.7 FL (ref 81.4–97.8)
MONOCYTES # BLD AUTO: 0.7 K/UL (ref 0–0.85)
MONOCYTES NFR BLD AUTO: 8 % (ref 0–13.4)
NEUTROPHILS # BLD AUTO: 4.12 K/UL (ref 2–7.15)
NEUTROPHILS NFR BLD: 47.4 % (ref 44–72)
NRBC # BLD AUTO: 0 K/UL
NRBC BLD-RTO: 0 /100 WBC
PLATELET # BLD AUTO: 354 K/UL (ref 164–446)
PMV BLD AUTO: 11.1 FL (ref 9–12.9)
RBC # BLD AUTO: 4.22 M/UL (ref 4.2–5.4)
WBC # BLD AUTO: 8.7 K/UL (ref 4.8–10.8)

## 2019-04-18 PROCEDURE — 85025 COMPLETE CBC W/AUTO DIFF WBC: CPT

## 2019-04-18 PROCEDURE — 36415 COLL VENOUS BLD VENIPUNCTURE: CPT

## 2019-05-12 DIAGNOSIS — R92.8 ABNORMAL FINDING ON BREAST IMAGING: ICD-10-CM

## 2019-05-13 RX ORDER — LEVOTHYROXINE SODIUM 100 MCG
TABLET ORAL
Qty: 90 TAB | Refills: 0 | Status: SHIPPED | OUTPATIENT
Start: 2019-05-13 | End: 2019-09-16 | Stop reason: SDUPTHER

## 2019-05-31 ENCOUNTER — OFFICE VISIT (OUTPATIENT)
Dept: MEDICAL GROUP | Facility: PHYSICIAN GROUP | Age: 54
End: 2019-05-31
Payer: COMMERCIAL

## 2019-05-31 VITALS
OXYGEN SATURATION: 97 % | BODY MASS INDEX: 31.28 KG/M2 | HEART RATE: 72 BPM | RESPIRATION RATE: 16 BRPM | TEMPERATURE: 98.1 F | SYSTOLIC BLOOD PRESSURE: 110 MMHG | WEIGHT: 170 LBS | DIASTOLIC BLOOD PRESSURE: 80 MMHG | HEIGHT: 62 IN

## 2019-05-31 DIAGNOSIS — D25.9 UTERINE LEIOMYOMA, UNSPECIFIED LOCATION: ICD-10-CM

## 2019-05-31 DIAGNOSIS — E03.8 HYPOTHYROIDISM DUE TO HASHIMOTO'S THYROIDITIS: ICD-10-CM

## 2019-05-31 DIAGNOSIS — J45.20 MILD INTERMITTENT ASTHMA WITHOUT COMPLICATION: ICD-10-CM

## 2019-05-31 DIAGNOSIS — E66.9 OBESITY (BMI 30-39.9): ICD-10-CM

## 2019-05-31 DIAGNOSIS — E06.3 HYPOTHYROIDISM DUE TO HASHIMOTO'S THYROIDITIS: ICD-10-CM

## 2019-05-31 PROCEDURE — 99204 OFFICE O/P NEW MOD 45 MIN: CPT | Performed by: INTERNAL MEDICINE

## 2019-05-31 RX ORDER — ALBUTEROL SULFATE 90 UG/1
2 AEROSOL, METERED RESPIRATORY (INHALATION) EVERY 4 HOURS PRN
Qty: 8.5 G | Refills: 3 | Status: ON HOLD | OUTPATIENT
Start: 2019-05-31 | End: 2019-10-02

## 2019-05-31 RX ORDER — ALBUTEROL SULFATE 90 UG/1
2 AEROSOL, METERED RESPIRATORY (INHALATION)
COMMUNITY
Start: 2015-10-05 | End: 2019-05-31 | Stop reason: SDUPTHER

## 2019-05-31 RX ORDER — MONTELUKAST SODIUM 10 MG/1
TABLET ORAL
COMMUNITY
Start: 2016-09-21 | End: 2019-05-30

## 2019-05-31 RX ORDER — LEVOTHYROXINE SODIUM 0.1 MG/1
100 TABLET ORAL
COMMUNITY
Start: 2016-06-21 | End: 2019-05-29

## 2019-05-31 ASSESSMENT — PATIENT HEALTH QUESTIONNAIRE - PHQ9: CLINICAL INTERPRETATION OF PHQ2 SCORE: 0

## 2019-05-31 NOTE — PROGRESS NOTES
PRIMARY CARE CLINIC NEW PATIENT H&P  Chief Complaint   Patient presents with   • Asthma     Inhaler refill    • Hypothyroidism     History of Present Illness     Hypothyroidism due to Hashimoto's thyroiditis  Taking levothyroxine 100 mcg daily. Positive TPO antibodies.     Mild intermittent asthma without complication  Taking singulair, inhaler as needed.     Fibroid, uterine  Has heavy bleeding and pain, in the process of being seen by Dr. Medina.     Obesity (BMI 30-39.9)  Has gained about 30 lbs since jonathan-menopause which she hasn't been able to get off. Has been trying to watch what she eats. Tries not to eat processed foods.     Current Outpatient Prescriptions   Medication Sig Dispense Refill   • albuterol (PROVENTIL HFA) 108 (90 Base) MCG/ACT Aero Soln inhalation aerosol Inhale 2 Puffs by mouth every four hours as needed for Shortness of Breath. 8.5 g 3   • SYNTHROID 100 MCG Tab TAKE 1 TABLET EVERY MORNING ON AN EMPTY STOMACH 90 Tab 0   • montelukast (SINGULAIR) 10 MG Tab TAKE 1 TABLET DAILY 90 Tab 3   • Cyanocobalamin (B-12) 1000 MCG Cap Take  by mouth.     • Multiple Vitamins-Minerals (ONE-A-DAY 50 PLUS PO) Take  by mouth.     • Omega-3 Fatty Acids (FISH OIL) 1200 MG Cap Take  by mouth.     • Calcium Carb-Cholecalciferol (CALCIUM 600 + D PO) Take  by mouth.     • Magnesium 400 MG Tab Take  by mouth.       No current facility-administered medications for this visit.        Past Medical History:   Diagnosis Date   • Asthma    • Thyroid disease      History reviewed. No pertinent surgical history.  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    ROS  Constitutional: Negative for fatigue/generalized weakness.   HEENT: Negative for  vision changes, hearing changes    Respiratory: Negative for shortness of breath  Cardiovascular: Negative for chest pain, palpitations  Gastrointestinal: Negative for blood in stool, constipation, diarrhea  Genitourinary: Negative for dysuria, polyuria  Musculoskeletal: Negative for myalgias, back pain, and joint pain.   Skin: Negative for rash  Neurological: Negative for numbness, tingling  Psychiatric/Behavioral: Negative for depression, anxiety       Objective   /80 (BP Cuff Size: Large adult)   Pulse 72   Temp 36.7 °C (98.1 °F)   Resp 16   Ht 1.575 m (5' 2.01\")   Wt 77.1 kg (170 lb)   SpO2 97%  Body mass index is 31.09 kg/m².    General: Alert, oriented. In no acute distress   HEET: EOMI, PERRL, conjunctiva non-injected, sclera non-icteric.  Nares patent with no significant congestion or drainage.  Gwendolyn pinnae, external auditory canals, TM pearly gray with normal light reflex bilaterally.Oral mucous membranes pink and moist with no lesions.  Neck: supple with no cervical, subclavicular lymphadenopathy, JVD, palpable thyroid nodules   Lungs: clear to auscultation bilaterally with good excursion.  CV: regular rate and rhythm.  Abdomen soft, non-distended, non-tender with normal bowel sounds. No hepatosplenomegaly, no masses palpated  Skin: no lesions. Warm, dry   Psychiatric: appropriate mood and affect     Assessment and Plan   The following treatment plan was discussed     1. Hypothyroidism due to Hashimoto's thyroiditis  TSH at goal on levothyroxine 100 mcg.     2. Mild intermittent asthma without complication  - albuterol (PROVENTIL HFA) 108 (90 Base) MCG/ACT Aero Soln inhalation aerosol; " Inhale 2 Puffs by mouth every four hours as needed for Shortness of Breath.  Dispense: 8.5 g; Refill: 3    3. BMI 31.0-31.9,adult  Discussed more of a plant based diet.   - Patient identified as having weight management issue.  Appropriate orders and counseling given.    4. Uterine leiomyoma, unspecified location  Will follow up with Dr. Medina.     No Follow-up on file.    Health Maintenance      Health Maintenance Due   Topic Date Due   • HEPATITIS C SCREENING  1965       Aneesh Samaniego MD  Internal Medicine  [facility]

## 2019-05-31 NOTE — ASSESSMENT & PLAN NOTE
Has gained about 30 lbs since jonathan-menopause which she hasn't been able to get off. Has been trying to watch what she eats. Tries not to eat processed foods.

## 2019-09-13 DIAGNOSIS — Z01.812 PRE-OPERATIVE LABORATORY EXAMINATION: ICD-10-CM

## 2019-09-13 LAB
ANION GAP SERPL CALC-SCNC: 9 MMOL/L (ref 0–11.9)
BUN SERPL-MCNC: 18 MG/DL (ref 8–22)
CALCIUM SERPL-MCNC: 9.4 MG/DL (ref 8.5–10.5)
CHLORIDE SERPL-SCNC: 102 MMOL/L (ref 96–112)
CO2 SERPL-SCNC: 25 MMOL/L (ref 20–33)
CREAT SERPL-MCNC: 1 MG/DL (ref 0.5–1.4)
ERYTHROCYTE [DISTWIDTH] IN BLOOD BY AUTOMATED COUNT: 48.4 FL (ref 35.9–50)
GLUCOSE SERPL-MCNC: 93 MG/DL (ref 65–99)
HCT VFR BLD AUTO: 40.3 % (ref 37–47)
HGB BLD-MCNC: 13.3 G/DL (ref 12–16)
MCH RBC QN AUTO: 31.2 PG (ref 27–33)
MCHC RBC AUTO-ENTMCNC: 33 G/DL (ref 33.6–35)
MCV RBC AUTO: 94.6 FL (ref 81.4–97.8)
PLATELET # BLD AUTO: 366 K/UL (ref 164–446)
PMV BLD AUTO: 10.5 FL (ref 9–12.9)
POTASSIUM SERPL-SCNC: 4.1 MMOL/L (ref 3.6–5.5)
RBC # BLD AUTO: 4.26 M/UL (ref 4.2–5.4)
SODIUM SERPL-SCNC: 136 MMOL/L (ref 135–145)
WBC # BLD AUTO: 11.8 K/UL (ref 4.8–10.8)

## 2019-09-13 PROCEDURE — 36415 COLL VENOUS BLD VENIPUNCTURE: CPT

## 2019-09-13 PROCEDURE — 80048 BASIC METABOLIC PNL TOTAL CA: CPT

## 2019-09-13 PROCEDURE — 85027 COMPLETE CBC AUTOMATED: CPT

## 2019-09-13 RX ORDER — COVID-19 ANTIGEN TEST
KIT MISCELLANEOUS PRN
Status: ON HOLD | COMMUNITY
End: 2019-10-02

## 2019-09-13 RX ORDER — OMEGA-3 FATTY ACIDS/FISH OIL 300-1000MG
CAPSULE ORAL PRN
Status: ON HOLD | COMMUNITY
End: 2019-10-02

## 2019-09-15 DIAGNOSIS — R92.8 ABNORMAL FINDING ON BREAST IMAGING: ICD-10-CM

## 2019-09-15 RX ORDER — LEVOTHYROXINE SODIUM 0.1 MG/1
TABLET ORAL
Qty: 90 TAB | Refills: 0 | Status: CANCELLED | OUTPATIENT
Start: 2019-09-15

## 2019-09-16 DIAGNOSIS — R92.8 ABNORMAL FINDING ON BREAST IMAGING: ICD-10-CM

## 2019-09-16 NOTE — TELEPHONE ENCOUNTER
No pharmacy on file for her refill, can we please clarify with her where she wants her synthroid sent?

## 2019-09-17 RX ORDER — LEVOTHYROXINE SODIUM 0.1 MG/1
TABLET ORAL
Qty: 90 TAB | Refills: 1 | Status: ON HOLD | OUTPATIENT
Start: 2019-09-17 | End: 2019-10-02

## 2019-09-17 NOTE — TELEPHONE ENCOUNTER
Was the patient seen in the last year in this department? Yes    Does patient have an active prescription for medications requested? No     Received Request Via: Pharmacy      Pt met protocol?: Yes    OV 5/19     TSH 3/19

## 2019-10-02 ENCOUNTER — ANESTHESIA (OUTPATIENT)
Dept: SURGERY | Facility: MEDICAL CENTER | Age: 54
End: 2019-10-02
Payer: COMMERCIAL

## 2019-10-02 ENCOUNTER — ANESTHESIA EVENT (OUTPATIENT)
Dept: SURGERY | Facility: MEDICAL CENTER | Age: 54
End: 2019-10-02
Payer: COMMERCIAL

## 2019-10-02 ENCOUNTER — HOSPITAL ENCOUNTER (OUTPATIENT)
Facility: MEDICAL CENTER | Age: 54
End: 2019-10-02
Attending: OBSTETRICS & GYNECOLOGY | Admitting: OBSTETRICS & GYNECOLOGY
Payer: COMMERCIAL

## 2019-10-02 VITALS
SYSTOLIC BLOOD PRESSURE: 134 MMHG | HEART RATE: 91 BPM | HEIGHT: 61 IN | TEMPERATURE: 97.3 F | RESPIRATION RATE: 16 BRPM | BODY MASS INDEX: 32.3 KG/M2 | DIASTOLIC BLOOD PRESSURE: 62 MMHG | WEIGHT: 171.08 LBS | OXYGEN SATURATION: 97 %

## 2019-10-02 LAB
HCG SERPL QL: NEGATIVE
PATHOLOGY CONSULT NOTE: NORMAL

## 2019-10-02 PROCEDURE — 700101 HCHG RX REV CODE 250: Performed by: OBSTETRICS & GYNECOLOGY

## 2019-10-02 PROCEDURE — A9270 NON-COVERED ITEM OR SERVICE: HCPCS | Performed by: ANESTHESIOLOGY

## 2019-10-02 PROCEDURE — 700101 HCHG RX REV CODE 250: Performed by: ANESTHESIOLOGY

## 2019-10-02 PROCEDURE — 160048 HCHG OR STATISTICAL LEVEL 1-5: Performed by: OBSTETRICS & GYNECOLOGY

## 2019-10-02 PROCEDURE — 500002 HCHG ADHESIVE, DERMABOND: Performed by: OBSTETRICS & GYNECOLOGY

## 2019-10-02 PROCEDURE — 36415 COLL VENOUS BLD VENIPUNCTURE: CPT

## 2019-10-02 PROCEDURE — 84703 CHORIONIC GONADOTROPIN ASSAY: CPT

## 2019-10-02 PROCEDURE — 160009 HCHG ANES TIME/MIN: Performed by: OBSTETRICS & GYNECOLOGY

## 2019-10-02 PROCEDURE — 700105 HCHG RX REV CODE 258: Performed by: OBSTETRICS & GYNECOLOGY

## 2019-10-02 PROCEDURE — 160035 HCHG PACU - 1ST 60 MINS PHASE I: Performed by: OBSTETRICS & GYNECOLOGY

## 2019-10-02 PROCEDURE — 502714 HCHG ROBOTIC SURGERY SERVICES: Performed by: OBSTETRICS & GYNECOLOGY

## 2019-10-02 PROCEDURE — 500868 HCHG NEEDLE, SURGI(VARES): Performed by: OBSTETRICS & GYNECOLOGY

## 2019-10-02 PROCEDURE — 160025 RECOVERY II MINUTES (STATS): Performed by: OBSTETRICS & GYNECOLOGY

## 2019-10-02 PROCEDURE — 160046 HCHG PACU - 1ST 60 MINS PHASE II: Performed by: OBSTETRICS & GYNECOLOGY

## 2019-10-02 PROCEDURE — 700102 HCHG RX REV CODE 250 W/ 637 OVERRIDE(OP): Performed by: ANESTHESIOLOGY

## 2019-10-02 PROCEDURE — 88307 TISSUE EXAM BY PATHOLOGIST: CPT

## 2019-10-02 PROCEDURE — 700104 HCHG RX REV CODE 254: Performed by: OBSTETRICS & GYNECOLOGY

## 2019-10-02 PROCEDURE — 501330 HCHG SET, CYSTO IRRIG TUBING: Performed by: OBSTETRICS & GYNECOLOGY

## 2019-10-02 PROCEDURE — 160031 HCHG SURGERY MINUTES - 1ST 30 MINS LEVEL 5: Performed by: OBSTETRICS & GYNECOLOGY

## 2019-10-02 PROCEDURE — 501838 HCHG SUTURE GENERAL: Performed by: OBSTETRICS & GYNECOLOGY

## 2019-10-02 PROCEDURE — 160047 HCHG PACU  - EA ADDL 30 MINS PHASE II: Performed by: OBSTETRICS & GYNECOLOGY

## 2019-10-02 PROCEDURE — 700111 HCHG RX REV CODE 636 W/ 250 OVERRIDE (IP): Performed by: ANESTHESIOLOGY

## 2019-10-02 PROCEDURE — 160002 HCHG RECOVERY MINUTES (STAT): Performed by: OBSTETRICS & GYNECOLOGY

## 2019-10-02 PROCEDURE — 160036 HCHG PACU - EA ADDL 30 MINS PHASE I: Performed by: OBSTETRICS & GYNECOLOGY

## 2019-10-02 PROCEDURE — 160042 HCHG SURGERY MINUTES - EA ADDL 1 MIN LEVEL 5: Performed by: OBSTETRICS & GYNECOLOGY

## 2019-10-02 RX ORDER — MIDAZOLAM HYDROCHLORIDE 1 MG/ML
INJECTION INTRAMUSCULAR; INTRAVENOUS PRN
Status: DISCONTINUED | OUTPATIENT
Start: 2019-10-02 | End: 2019-10-02 | Stop reason: SURG

## 2019-10-02 RX ORDER — CEFAZOLIN SODIUM 1 G/3ML
INJECTION, POWDER, FOR SOLUTION INTRAMUSCULAR; INTRAVENOUS PRN
Status: DISCONTINUED | OUTPATIENT
Start: 2019-10-02 | End: 2019-10-02 | Stop reason: SURG

## 2019-10-02 RX ORDER — ONDANSETRON 2 MG/ML
4 INJECTION INTRAMUSCULAR; INTRAVENOUS
Status: DISCONTINUED | OUTPATIENT
Start: 2019-10-02 | End: 2019-10-02 | Stop reason: HOSPADM

## 2019-10-02 RX ORDER — OXYCODONE HCL 5 MG/5 ML
5 SOLUTION, ORAL ORAL
Status: COMPLETED | OUTPATIENT
Start: 2019-10-02 | End: 2019-10-02

## 2019-10-02 RX ORDER — GABAPENTIN 300 MG/1
300 CAPSULE ORAL ONCE
Status: COMPLETED | OUTPATIENT
Start: 2019-10-02 | End: 2019-10-02

## 2019-10-02 RX ORDER — MEPERIDINE HYDROCHLORIDE 25 MG/ML
12.5 INJECTION INTRAMUSCULAR; INTRAVENOUS; SUBCUTANEOUS
Status: DISCONTINUED | OUTPATIENT
Start: 2019-10-02 | End: 2019-10-02 | Stop reason: HOSPADM

## 2019-10-02 RX ORDER — PROMETHAZINE HYDROCHLORIDE 25 MG/1
25 SUPPOSITORY RECTAL EVERY 4 HOURS PRN
Status: CANCELLED | OUTPATIENT
Start: 2019-10-02

## 2019-10-02 RX ORDER — ONDANSETRON 2 MG/ML
INJECTION INTRAMUSCULAR; INTRAVENOUS PRN
Status: DISCONTINUED | OUTPATIENT
Start: 2019-10-02 | End: 2019-10-02 | Stop reason: SURG

## 2019-10-02 RX ORDER — LEVOTHYROXINE SODIUM 0.1 MG/1
100 TABLET ORAL
COMMUNITY
End: 2021-06-25 | Stop reason: DRUGHIGH

## 2019-10-02 RX ORDER — LIDOCAINE HYDROCHLORIDE 20 MG/ML
INJECTION, SOLUTION EPIDURAL; INFILTRATION; INTRACAUDAL; PERINEURAL PRN
Status: DISCONTINUED | OUTPATIENT
Start: 2019-10-02 | End: 2019-10-02 | Stop reason: SURG

## 2019-10-02 RX ORDER — MONTELUKAST SODIUM 10 MG/1
10 TABLET ORAL EVERY EVENING
COMMUNITY
End: 2021-03-16 | Stop reason: SDUPTHER

## 2019-10-02 RX ORDER — SIMETHICONE 80 MG
80 TABLET,CHEWABLE ORAL EVERY 8 HOURS PRN
Status: CANCELLED | OUTPATIENT
Start: 2019-10-02

## 2019-10-02 RX ORDER — OXYCODONE HCL 5 MG/5 ML
10 SOLUTION, ORAL ORAL
Status: COMPLETED | OUTPATIENT
Start: 2019-10-02 | End: 2019-10-02

## 2019-10-02 RX ORDER — ROCURONIUM BROMIDE 10 MG/ML
INJECTION, SOLUTION INTRAVENOUS PRN
Status: DISCONTINUED | OUTPATIENT
Start: 2019-10-02 | End: 2019-10-02 | Stop reason: SURG

## 2019-10-02 RX ORDER — BUPIVACAINE HYDROCHLORIDE AND EPINEPHRINE 2.5; 5 MG/ML; UG/ML
INJECTION, SOLUTION EPIDURAL; INFILTRATION; INTRACAUDAL; PERINEURAL
Status: DISCONTINUED | OUTPATIENT
Start: 2019-10-02 | End: 2019-10-02 | Stop reason: HOSPADM

## 2019-10-02 RX ORDER — LIDOCAINE HYDROCHLORIDE 40 MG/ML
SOLUTION TOPICAL PRN
Status: DISCONTINUED | OUTPATIENT
Start: 2019-10-02 | End: 2019-10-02 | Stop reason: SURG

## 2019-10-02 RX ORDER — HYDROMORPHONE HYDROCHLORIDE 1 MG/ML
0.4 INJECTION, SOLUTION INTRAMUSCULAR; INTRAVENOUS; SUBCUTANEOUS
Status: DISCONTINUED | OUTPATIENT
Start: 2019-10-02 | End: 2019-10-02 | Stop reason: HOSPADM

## 2019-10-02 RX ORDER — SODIUM CHLORIDE, SODIUM LACTATE, POTASSIUM CHLORIDE, CALCIUM CHLORIDE 600; 310; 30; 20 MG/100ML; MG/100ML; MG/100ML; MG/100ML
INJECTION, SOLUTION INTRAVENOUS CONTINUOUS
Status: DISCONTINUED | OUTPATIENT
Start: 2019-10-02 | End: 2019-10-02 | Stop reason: HOSPADM

## 2019-10-02 RX ORDER — HYDROMORPHONE HYDROCHLORIDE 1 MG/ML
0.1 INJECTION, SOLUTION INTRAMUSCULAR; INTRAVENOUS; SUBCUTANEOUS
Status: DISCONTINUED | OUTPATIENT
Start: 2019-10-02 | End: 2019-10-02 | Stop reason: HOSPADM

## 2019-10-02 RX ORDER — KETAMINE HYDROCHLORIDE 50 MG/ML
INJECTION, SOLUTION INTRAMUSCULAR; INTRAVENOUS PRN
Status: DISCONTINUED | OUTPATIENT
Start: 2019-10-02 | End: 2019-10-02 | Stop reason: SURG

## 2019-10-02 RX ORDER — HALOPERIDOL 5 MG/ML
1 INJECTION INTRAMUSCULAR
Status: DISCONTINUED | OUTPATIENT
Start: 2019-10-02 | End: 2019-10-02 | Stop reason: HOSPADM

## 2019-10-02 RX ORDER — HYDROMORPHONE HYDROCHLORIDE 1 MG/ML
0.2 INJECTION, SOLUTION INTRAMUSCULAR; INTRAVENOUS; SUBCUTANEOUS
Status: DISCONTINUED | OUTPATIENT
Start: 2019-10-02 | End: 2019-10-02 | Stop reason: HOSPADM

## 2019-10-02 RX ORDER — DEXAMETHASONE SODIUM PHOSPHATE 4 MG/ML
INJECTION, SOLUTION INTRA-ARTICULAR; INTRALESIONAL; INTRAMUSCULAR; INTRAVENOUS; SOFT TISSUE PRN
Status: DISCONTINUED | OUTPATIENT
Start: 2019-10-02 | End: 2019-10-02 | Stop reason: SURG

## 2019-10-02 RX ORDER — ACETAMINOPHEN 500 MG
1000 TABLET ORAL ONCE
Status: COMPLETED | OUTPATIENT
Start: 2019-10-02 | End: 2019-10-02

## 2019-10-02 RX ORDER — KETOROLAC TROMETHAMINE 30 MG/ML
INJECTION, SOLUTION INTRAMUSCULAR; INTRAVENOUS PRN
Status: DISCONTINUED | OUTPATIENT
Start: 2019-10-02 | End: 2019-10-02 | Stop reason: SURG

## 2019-10-02 RX ADMIN — FENTANYL CITRATE 150 MCG: 50 INJECTION, SOLUTION INTRAMUSCULAR; INTRAVENOUS at 13:51

## 2019-10-02 RX ADMIN — KETAMINE HYDROCHLORIDE 35 MG: 50 INJECTION, SOLUTION INTRAMUSCULAR; INTRAVENOUS at 14:00

## 2019-10-02 RX ADMIN — INDIGO CARMINE 5 ML: 8 INJECTION, SOLUTION INTRAMUSCULAR; INTRAVENOUS at 15:20

## 2019-10-02 RX ADMIN — SODIUM CHLORIDE, POTASSIUM CHLORIDE, SODIUM LACTATE AND CALCIUM CHLORIDE: 600; 310; 30; 20 INJECTION, SOLUTION INTRAVENOUS at 12:27

## 2019-10-02 RX ADMIN — LIDOCAINE HYDROCHLORIDE 4 ML: 40 SOLUTION TOPICAL at 13:53

## 2019-10-02 RX ADMIN — FENTANYL CITRATE 50 MCG: 50 INJECTION, SOLUTION INTRAMUSCULAR; INTRAVENOUS at 14:51

## 2019-10-02 RX ADMIN — OXYCODONE HYDROCHLORIDE 10 MG: 5 SOLUTION ORAL at 17:14

## 2019-10-02 RX ADMIN — ROCURONIUM BROMIDE 30 MG: 10 INJECTION, SOLUTION INTRAVENOUS at 13:51

## 2019-10-02 RX ADMIN — MIDAZOLAM 2 MG: 1 INJECTION INTRAMUSCULAR; INTRAVENOUS at 13:46

## 2019-10-02 RX ADMIN — SUGAMMADEX 200 MG: 100 INJECTION, SOLUTION INTRAVENOUS at 15:42

## 2019-10-02 RX ADMIN — CEFAZOLIN 2 G: 330 INJECTION, POWDER, FOR SOLUTION INTRAMUSCULAR; INTRAVENOUS at 13:51

## 2019-10-02 RX ADMIN — PROPOFOL 140 MG: 10 INJECTION, EMULSION INTRAVENOUS at 13:51

## 2019-10-02 RX ADMIN — EPHEDRINE SULFATE 5 MG: 50 INJECTION, SOLUTION INTRAVENOUS at 14:02

## 2019-10-02 RX ADMIN — SODIUM CHLORIDE, POTASSIUM CHLORIDE, SODIUM LACTATE AND CALCIUM CHLORIDE: 600; 310; 30; 20 INJECTION, SOLUTION INTRAVENOUS at 14:41

## 2019-10-02 RX ADMIN — EPHEDRINE SULFATE 5 MG: 50 INJECTION, SOLUTION INTRAVENOUS at 14:05

## 2019-10-02 RX ADMIN — DEXAMETHASONE SODIUM PHOSPHATE 8 MG: 4 INJECTION, SOLUTION INTRA-ARTICULAR; INTRALESIONAL; INTRAMUSCULAR; INTRAVENOUS; SOFT TISSUE at 13:51

## 2019-10-02 RX ADMIN — ACETAMINOPHEN 1000 MG: 500 TABLET ORAL at 12:29

## 2019-10-02 RX ADMIN — GABAPENTIN 300 MG: 300 CAPSULE ORAL at 12:29

## 2019-10-02 RX ADMIN — LIDOCAINE HYDROCHLORIDE 80 MG: 20 INJECTION, SOLUTION EPIDURAL; INFILTRATION; INTRACAUDAL at 13:51

## 2019-10-02 RX ADMIN — EPHEDRINE SULFATE 5 MG: 50 INJECTION, SOLUTION INTRAVENOUS at 14:20

## 2019-10-02 RX ADMIN — ONDANSETRON 4 MG: 2 INJECTION INTRAMUSCULAR; INTRAVENOUS at 15:42

## 2019-10-02 RX ADMIN — ROCURONIUM BROMIDE 10 MG: 10 INJECTION, SOLUTION INTRAVENOUS at 14:51

## 2019-10-02 RX ADMIN — FENTANYL CITRATE 50 MCG: 50 INJECTION, SOLUTION INTRAMUSCULAR; INTRAVENOUS at 15:19

## 2019-10-02 RX ADMIN — KETOROLAC TROMETHAMINE 30 MG: 30 INJECTION, SOLUTION INTRAMUSCULAR at 15:42

## 2019-10-02 ASSESSMENT — PAIN SCALES - GENERAL: PAIN_LEVEL: 1

## 2019-10-02 NOTE — ANESTHESIA POSTPROCEDURE EVALUATION
Patient: Ester Aden    Procedure Summary     Date:  10/02/19 Room / Location:  [place] [address] / SURGERY [facility]    Anesthesia Start:  1346 Anesthesia Stop:  1556    Procedures:       HYSTERECTOMY, ROBOT-ASSISTED, USING DA KEKE XI (N/A Abdomen)      CYSTOSCOPY (N/A Bladder)      SALPINGECTOMY (Bilateral Abdomen) Diagnosis:  (ENLARGED UTERUS, FIBROIDS, PELVIC PAIN AND MENORRHAGIA)    Surgeon:  Leslie Medina M.D. Responsible Provider:  Julita Beckett M.D.    Anesthesia Type:  general ASA Status:  2          Final Anesthesia Type: general  Last vitals  BP   Blood Pressure: 119/56    Temp   36.3 °C (97.4 °F)    Pulse   Pulse: 92   Resp   16    SpO2   99 %      Anesthesia Post Evaluation    Patient location during evaluation: PACU  Patient participation: complete - patient participated  Level of consciousness: awake  Pain score: 1    Airway patency: patent  Anesthetic complications: no  Cardiovascular status: adequate  Respiratory status: acceptable  Hydration status: acceptable    PONV: none           Nurse Pain Score: 0 (NPRS)

## 2019-10-02 NOTE — PROGRESS NOTES
Med rec updated and complete  Allergies reviewed  Interviewed pt with family at bedside with permission from pt.  Pt reports she was told to take a medication, not sure the name or strength.  Called Roxanne @ [phone] to verify what medications she was told to take.   Went back asked pt if this was the right medications she was told to take.  Pt reports no antibiotics in the last 2 weeks

## 2019-10-02 NOTE — ANESTHESIA QCDR
2019 [facility] Clinical Data Registry (for Quality Improvement)     Postoperative nausea/vomiting risk protocol (Adult = 18 yrs and Pediatric 3-17 yrs)- (430 and 463)  General inhalation anesthetic (NOT TIVA) with PONV risk factors: Yes  Provision of anti-emetic therapy with at least 2 different classes of agents: Yes   Patient DID NOT receive anti-emetic therapy and reason is documented in Medical Record:  N/A    Multimodal Pain Management- (AQI59)  Patient undergoing Elective Surgery (i.e. Outpatient, or ASC, or Prescheduled Surgery prior to Hospital Admission): Yes  Use of Multimodal Pain Management, two or more drugs and/or interventions, NOT including systemic opioids: Yes   Exception: Documented allergy to multiple classes of analgesics:  N/A    PACU assessment of acute postoperative pain prior to Anesthesia Care End- Applies to Patients Age = 18- (ABG7)  Initial PACU pain score is which of the following: < 7/10  Patient unable to report pain score: N/A    Post-anesthetic transfer of care checklist/protocol to PACU/ICU- (426 and 427)  Upon conclusion of case, patient transferred to which of the following locations: PACU/Non-ICU  Use of transfer checklist/protocol: Yes  Exclusion: Service Performed in Patient Hospital Room (and thus did not require transfer): N/A    PACU Reintubation- (AQI31)  General anesthesia requiring endotracheal intubation (ETT) along with subsequent extubation in OR or PACU: Yes  Required reintubation in the PACU: No   Extubation was a planned trial documented in the medical record prior to removal of the original airway device:  N/A    Unplanned admission to ICU related to anesthesia service up through end of PACU care- (MD51)  Unplanned admission to ICU (not initially anticipated at anesthesia start time): No

## 2019-10-02 NOTE — ANESTHESIA PROCEDURE NOTES
Airway  Date/Time: 10/2/2019 1:53 PM  Performed by: Julita Beckett M.D.  Authorized by: Julita Beckett M.D.     Location:  OR  Urgency:  Elective  Indications for Airway Management:  Anesthesia  Spontaneous Ventilation: absent    Sedation Level:  Deep  Preoxygenated: Yes    Patient Position:  Sniffing  Mask Difficulty Assessment:  1 - vent by mask  Final Airway Type:  Endotracheal airway  Final Endotracheal Airway:  ETT  Cuffed: Yes    Technique Used for Successful ETT Placement:  Direct laryngoscopy  Devices/Methods Used in Placement:  Cricoid pressure and intubating stylet  Insertion Site:  Oral  Blade Type:  Sherri  Laryngoscope Blade/Videolaryngoscope Blade Size:  3  ETT Size (mm):  7.0  Measured from:  Teeth  ETT to Teeth (cm):  22  Placement Verified by: capnometry and palpation of cuff    Cormack-Lehane Classification:  Grade IIb - view of arytenoids or posterior of glottis only  Number of Attempts at Approach:  1

## 2019-10-02 NOTE — OR SURGEON
Immediate Post OP Note    PreOp Diagnosis: Enlarged fibroid uterus, DUB, anemia,pelvic pain and pressure, frequency and urgency of urination    PostOp Diagnosis: Same    Procedure(s):  HYSTERECTOMY greater than 250 gms, ROBOT-ASSISTED, USING DA KEKE XI - Wound Class: Clean Contaminated  CYSTOSCOPY - Wound Class: Clean Contaminated  BILATERAL SALPINGECTOMY - Wound Class: Clean Contaminated  MODIFIED MCCALLS CULDOPLASTY      Surgeon(s):  Leslie Medina M.D.    Anesthesiologist/Type of Anesthesia:  Anesthesiologist: Julita Beckett M.D./General    Surgical Staff:  Assistant: Leah Tierney R.N.  Circulator: Malathi Martinez R.N.  Scrub Person: Kristin Rich R.N.; Odalys Laird; Jf Coffey R.N.    Specimens removed if any:  ID Type Source Tests Collected by Time Destination   A : uterus, bilateral tubes, cervix Tissue Uterus PATHOLOGY SPECIMEN Leslie Medina M.D. 10/2/2019 1435        Estimated Blood Loss: 30 CC    Findings: Uterus is enlarged approximately 12-14 weeks size, normal tubes and ovaries, normal cystoscopy postop, uterine weight greater than 250 gms, normal vagina postop    Complications: None        10/2/2019 3:55 PM Leslie Medina M.D.

## 2019-10-02 NOTE — ANESTHESIA PREPROCEDURE EVALUATION
55 yo w/enlarged uterus and menorrhagia    Relevant Problems   PULMONARY   (+) Mild intermittent asthma without complication      ENDO   (+) Hypothyroidism due to Hashimoto's thyroiditis      Other   (+) Obesity (BMI 30-39.9)   (+) Pure hypercholesterolemia     No prior hx anesthesia/Mom slow to awaken from anesthesia    Denies CAD, CP/SOB, CVA, HTN, DM, LIVER/KIDNEY DZ, GERD, URI    Physical Exam    Airway   Mallampati: II  TM distance: >3 FB  Neck ROM: full       Cardiovascular   Rhythm: regular  Rate: normal  (-) murmur     Dental - normal exam         Pulmonary   Breath sounds clear to auscultation     Abdominal    Neurological - normal exam                 Anesthesia Plan    ASA 2       Plan - general       Airway plan will be ETT        Induction: intravenous    Postoperative Plan: Postoperative administration of opioids is intended.    Pertinent diagnostic labs and testing reviewed    Informed Consent:    Anesthetic plan and risks discussed with patient.    Use of blood products discussed with: patient whom consented to blood products.

## 2019-10-02 NOTE — H&P
"DATE OF OPERATION:  10/02/2019    CHIEF COMPLAINT:  Enlarged fibroid uterus, pelvic pain and pressure, heavy   menses.    HISTORY OF PRESENT ILLNESS:  The patient is a 54-year-old  AB1 with very   heavy periods over this last 6 months to a year.  The patient states that her   periods are \"off the charts.\"  She feels like she was hemorrhaging.  She soaks   through pads and tampons in 5 minutes.  She was given progesterone, but it   made for more pain, so she quit.  Her periods last for 7 days.  They have been   very regular until recently every 25-30 days.  She was not anemic on her   recent CBC.  On exam, the patient has a positive fibroid in the left fundus,   which was palpable and her uterus was enlarged approximately 12 weeks' size.    The patient was sent to our office by Aparna Arce at Dr. Aldridge's office   for evaluation and treatment of her fibroid uterus and bleeding.  Risks,   benefits, and alternatives were discussed with the patient and she has decided   to proceed with a da Linda total laparoscopic hysterectomy with possible   bilateral salpingo-oophorectomy.    PAST MEDICAL HISTORY:  Asthma, hemorrhoids, thyroid disease.    PAST SURGICAL HISTORY:  None.    MENSTRUAL HISTORY:  The patient underwent menarche at age 14.  She has periods   that last for 5-8 days and they occur every 25-30 days.  The patient states   these are very heavy and painful.    OBSTETRICAL HISTORY:  She has had 3 pregnancies, 2 babies born alive, 1   miscarriage, the largest baby weighed 6 pounds 11 ounces, last delivery was in   .    MEDICATIONS:  Include Synthroid, montelukast, vitamins and calcium.    FAMILY HISTORY:  An uncle with throat cancer, an aunt with breast cancer.    Mother's grandparents with colon cancer and  with kidney cancer.  Her   father  at age 76 from dementia.  Otherwise, noncontributory.    SOCIAL HISTORY:  The patient does not smoke, drinks occasionally, does not do   recreational " drugs.    ALLERGIES:  No known drug allergies.    PHYSICAL EXAMINATION:  VITAL SIGNS:  The patient's blood pressure is 118/90, her weight is 173,   height is 5 feet 1.  HEENT:  Within normal limits.  NECK:  Supple, without lymphadenopathy or thyromegaly.  CARDIOVASCULAR:  Regular rate and rhythm.  LUNGS:  Clear to auscultation.  BACK:  No CVA tenderness.  ABDOMEN:  Soft and nontender, nondistended.  No masses are palpable.  PELVIC:  EGBUS appears normal.  Vagina appears normal.  Cervix appears normal.    Bimanual exam reveals an enlarged approximately 12-14-week size uterus,   which is globular in contour, consistent with a fibroid uterus.  There are no   adnexal masses appreciated.  EXTREMITIES:  Benign.    ASSESSMENT:  1.  A 54-year-old  AB1, perimenopausal female.  2.  Heavy vaginal bleeding.  3.  Pelvic pain and pressure.  4.  Urgency and frequency of urination, proliferative endometrium on   endometrial biopsy, uterus measuring 11x9x7 cm.  5.  Thyroid disease.  6.  Asthma.    PLAN:  The patient is scheduled for a da Linda laparoscopic total hysterectomy   with possible bilateral salpingo-oophorectomy, although she prefers ovarian   preservation if possible.  Risks, benefits, alternatives and procedure were   discussed with the patient in detail and she agrees to proceed.  Preoperative   labs will be obtained.  Preoperative antibiotics will be administered.  If she   has any problems or questions, she can contact my office.       ____________________________________     MD ERIC Rae / TAB    DD:  10/01/2019 21:53:12  DT:  10/02/2019 04:13:20    D#:  [id number]  Job#:  [id number]    cc: RAYSA CHRISTENSEN, BARBRA SAUCEDO MD

## 2019-10-03 NOTE — OR NURSING
Patient's bladder back filled per orders. Patient up to restroom in steady gait, pt successful void. Patient pvr 35ml.

## 2019-10-03 NOTE — OR NURSING
Assumed patient care at 1750. Patient alert and oriented . See flow sheet for VS. Call light and personal belongings within reach.  Saima in lowest position. Monitor alarms set appropriately.

## 2019-10-03 NOTE — DISCHARGE INSTRUCTIONS
ACTIVITY: Rest and take it easy for the first 24 hours.  A responsible adult is recommended to remain with you during that time.  It is normal to feel sleepy.  We encourage you to not do anything that requires balance, judgment or coordination.    MILD FLU-LIKE SYMPTOMS ARE NORMAL. YOU MAY EXPERIENCE GENERALIZED MUSCLE ACHES, THROAT IRRITATION, HEADACHE AND/OR SOME NAUSEA.    FOR 24 HOURS DO NOT:  Drive, operate machinery or run household appliances.  Drink beer or alcoholic beverages.   Make important decisions or sign legal documents.    SPECIAL INSTRUCTIONS: Follow any instructions given by Dr Medina    DIET: To avoid nausea, slowly advance diet as tolerated, avoiding spicy or greasy foods for the first day.  Add more substantial food to your diet according to your physician's instructions.  INCREASE FLUIDS AND FIBER TO AVOID CONSTIPATION.    SURGICAL DRESSING/BATHING: Follow any instructions given by Dr Medina    FOLLOW-UP APPOINTMENT:  A follow-up appointment should be arranged with your doctor in 1-2 weeks; call to schedule.    You should CALL YOUR PHYSICIAN if you develop:  Fever greater than 101 degrees F.  Pain not relieved by medication, or persistent nausea or vomiting.  Excessive bleeding (blood soaking through dressing) or unexpected drainage from the wound.  Extreme redness or swelling around the incision site, drainage of pus or foul smelling drainage.  Inability to urinate or empty your bladder within 8 hours.  Problems with breathing or chest pain.    You should call 911 if you develop problems with breathing or chest pain.  If you are unable to contact your doctor or surgical center, you should go to the nearest emergency room or urgent care center.  Physician's telephone #: [phone]       If any questions arise, call your doctor.  If your doctor is not available, please feel free to call the Surgical Center at [phone].  The Center is open Monday through Friday from 7AM to 7PM.  You  can also call the HEALTH HOTLINE open 24 hours/day, 7 days/week and speak to a nurse at [phone], or toll free at [phone].    A registered nurse may call you a few days after your surgery to see how you are doing after your procedure.    MEDICATIONS: Resume taking daily medication.  Take prescribed pain medication with food.  If no medication is prescribed, you may take non-aspirin pain medication if needed.  PAIN MEDICATION CAN BE VERY CONSTIPATING.  Take a stool softener or laxative such as senokot, pericolace, or milk of magnesia if needed.    Last pain medication given at 5:17pm.    If your physician has prescribed pain medication that includes Acetaminophen (Tylenol), do not take additional Acetaminophen (Tylenol) while taking the prescribed medication.    Depression / Suicide Risk    As you are discharged from this [facility] facility, it is important to learn how to keep safe from harming yourself.    Recognize the warning signs:  · Abrupt changes in personality, positive or negative- including increase in energy   · Giving away possessions  · Change in eating patterns- significant weight changes-  positive or negative  · Change in sleeping patterns- unable to sleep or sleeping all the time   · Unwillingness or inability to communicate  · Depression  · Unusual sadness, discouragement and loneliness  · Talk of wanting to die  · Neglect of personal appearance   · Rebelliousness- reckless behavior  · Withdrawal from people/activities they love  · Confusion- inability to concentrate     If you or a loved one observes any of these behaviors or has concerns about self-harm, here's what you can do:  · Talk about it- your feelings and reasons for harming yourself  · Remove any means that you might use to hurt yourself (examples: pills, rope, extension cords, firearm)  · Get professional help from the community (Mental Health, Substance Abuse, psychological counseling)  · Do not be alone:Call your Safe  Contact- someone whom you trust who will be there for you.  · Call your local CRISIS HOTLINE 528-9128 or 204-309-5415  · Call your local Children's Mobile Crisis Response Team [place] (421) 048-1173 or www.NI  · Call the toll free National Suicide Prevention Hotlines   · National Suicide Prevention Lifeline 104-592-SWNG (1706)  · National Mi[employer] Line Network 800-SUICIDE (482-9360)

## 2019-10-03 NOTE — OP REPORT
DATE OF SERVICE:  10/02/2019    PREOPERATIVE DIAGNOSES:  Enlarged fibroid uterus, dysfunctional uterine   bleeding, anemia, pelvic pain and pressure, frequency and urgency of   urination.    POSTOPERATIVE DIAGNOSES:  Enlarged fibroid uterus, dysfunctional uterine   bleeding, anemia, pelvic pain and pressure, frequency and urgency of   urination.    PROCEDURES:  Da Linda Xi laparoscopic total hysterectomy with bilateral   salpingectomy.  Uterus is greater than 250 g.  Modified Patel's culdoplasty   and cystoscopy.    SURGEON:  Leslie Medina MD    ASSISTANT:  Leah Tierney, certified first assist.    ANESTHESIOLOGIST:  Julita Beckett MD    ANESTHESIA:  General endotracheal.    ESTIMATED BLOOD LOSS:  30 mL    FINDINGS AT THE TIME OF SURGERY:  Exam under anesthesia reveals an enlarged   approximately 12-14 week size uterus with no adnexal masses.  Laparoscopic   findings confirm the uterus to be enlarged, greater than 250 g, normal tubes   and ovaries bilaterally.  Cystoscopic findings post-procedure revealed a   normal bladder with good efflux of urine through the ureteral os bilaterally,   normal vagina postoperatively.    TECHNIQUE:  The patient was taken to the operating room where general   anesthesia was placed.  She was then placed in the Jhon stirrups with   excellent position, prepped and draped in the normal sterile fashion.  A   V-Care uterine manipulator was then placed without difficulty.  Attention was   then turned to the abdomen where 0.25% Marcaine with epinephrine was then   injected at the base of umbilicus, 1 cm incision was made with scalpel.    Veress needle placed, pneumoperitoneum created with CO2 gas.  The trocar was   introduced without difficulty and the above findings were noted.  An 8 mm   incision was made 10 cm to the right, 10 and 20 cm to the left and these   trocars were placed under direct visualization without difficulty.  Once in   the appropriate location, the da  Linda was brought to the patient's right side   and side docking took place without difficulty.  The Harmonic scalpel was   placed in the right arm, [place] bipolar in the left arm and a straight scope   was used for this procedure.  Once instruments were in their appropriate   location, I went to the [employer] Linda console.  The patient elected to maintain her   ovaries if possible.  I saw no reason not to.  The fallopian tubes were   excised and brought out through the assistant port.  The uteroovarian   ligaments were then clamped, desiccated, and cut.  The round ligament clamped,   desiccated, and cut.  Intervening tissue clamped, desiccated, and cut.    Anterior leaf of the broad ligament incised, posterior leaf incised.  Uterine   vessels were then clamped, desiccated and cut about the level of the cardinal   ligament complex.  The bladder was then dissected down well below that V-Care   cup and the colpotomy incision was made with Harmonic scalpel.  The uterus was   too big to be delivered directly, so the 14 cm applied bag was placed inside   the pelvis and the uterus was placed inside the bag.  The bag was exteriorized   vaginally and using the ExCITE technique via contained extraction, the uterus   was debulked until I was able to deliver it through the vagina.  The bag was   intact at the end of the procedure.  I then placed a pneumo occluder inside   the vagina and attention was then turned to the cuff closure.  The Harmonic   scalpel was switched out for a lj suture cut, 0 Vicryl was placed at the   left cuff angle incorporating the uterosacral ligament and a barbed PDS suture   was placed at the right cuff angle incorporating the uterosacral ligament for   excellent support.  The Vicryl was then used to run the vaginal mucosa in a   running fashion and the barbed suture was used to reapproximate the   pubocervical fascia in a running fashion.  Excellent reapproximation was   achieved.  PDS was used to  plicate those uterosacral ligaments in the midline,   incorporating the pubocervical fascia posteriorly for excellent support.  The   pedicles were gone over with the bipolar cautery.  There was no bleeding.    Monocryl was introduced and this was used to pexy the ovaries to the round   ligaments and also closed the peritoneum from round ligament to round ligament   incorporating the uterosacral ligament in the midportion of the suture line.    Excellent reapproximation was noted.  Irrigation was performed and   pneumoperitoneum was released.  I then scrubbed back into the case and the   skin was closed with Dermabond.  Excellent reapproximation was achieved.    After IV administration of indigo carmine, cystoscopic examination of bladder   revealed a normal bladder with good efflux of indigo carmine through the   ureteral os bilaterally and otherwise normal bladder.  The cystoscope was   removed and Subramanian catheter replaced.  A Graves speculum was then used to view   the vagina, this appeared normal with excellent apical support, no bleeding.    The Graves speculum was removed.  Bimanual exam performed.  This was normal.    The patient tolerated the procedure very well and was brought to recovery room   in stable condition.       ____________________________________     MD ERIC Rae / TAB    DD:  10/02/2019 16:04:17  DT:  10/03/2019 10:10:28    D#:  [id number]  Job#:  [id number]

## 2019-12-06 ENCOUNTER — HOSPITAL ENCOUNTER (OUTPATIENT)
Dept: LAB | Facility: MEDICAL CENTER | Age: 54
End: 2019-12-06
Attending: OBSTETRICS & GYNECOLOGY
Payer: COMMERCIAL

## 2019-12-06 LAB
25(OH)D3 SERPL-MCNC: 32 NG/ML (ref 30–100)
ALBUMIN SERPL BCP-MCNC: 4.3 G/DL (ref 3.2–4.9)
ALBUMIN/GLOB SERPL: 1.7 G/DL
ALP SERPL-CCNC: 57 U/L (ref 30–99)
ALT SERPL-CCNC: 23 U/L (ref 2–50)
ANION GAP SERPL CALC-SCNC: 7 MMOL/L (ref 0–11.9)
AST SERPL-CCNC: 17 U/L (ref 12–45)
BILIRUB SERPL-MCNC: 0.5 MG/DL (ref 0.1–1.5)
BUN SERPL-MCNC: 14 MG/DL (ref 8–22)
CALCIUM SERPL-MCNC: 9.3 MG/DL (ref 8.5–10.5)
CHLORIDE SERPL-SCNC: 104 MMOL/L (ref 96–112)
CHOLEST SERPL-MCNC: 183 MG/DL (ref 100–199)
CO2 SERPL-SCNC: 27 MMOL/L (ref 20–33)
CREAT SERPL-MCNC: 0.86 MG/DL (ref 0.5–1.4)
EST. AVERAGE GLUCOSE BLD GHB EST-MCNC: 117 MG/DL
ESTRADIOL SERPL-MCNC: 347 PG/ML
FASTING STATUS PATIENT QL REPORTED: NORMAL
FSH SERPL-ACNC: 13.3 MIU/ML
GLOBULIN SER CALC-MCNC: 2.6 G/DL (ref 1.9–3.5)
GLUCOSE SERPL-MCNC: 93 MG/DL (ref 65–99)
HBA1C MFR BLD: 5.7 % (ref 0–5.6)
HDLC SERPL-MCNC: 53 MG/DL
LDLC SERPL CALC-MCNC: 105 MG/DL
POTASSIUM SERPL-SCNC: 4.1 MMOL/L (ref 3.6–5.5)
PROT SERPL-MCNC: 6.9 G/DL (ref 6–8.2)
SODIUM SERPL-SCNC: 138 MMOL/L (ref 135–145)
TRIGL SERPL-MCNC: 127 MG/DL (ref 0–149)
TSH SERPL DL<=0.005 MIU/L-ACNC: 0.24 UIU/ML (ref 0.38–5.33)

## 2019-12-06 PROCEDURE — 83001 ASSAY OF GONADOTROPIN (FSH): CPT

## 2019-12-06 PROCEDURE — 83036 HEMOGLOBIN GLYCOSYLATED A1C: CPT

## 2019-12-06 PROCEDURE — 82306 VITAMIN D 25 HYDROXY: CPT

## 2019-12-06 PROCEDURE — 80061 LIPID PANEL: CPT

## 2019-12-06 PROCEDURE — 82670 ASSAY OF TOTAL ESTRADIOL: CPT

## 2019-12-06 PROCEDURE — 80053 COMPREHEN METABOLIC PANEL: CPT

## 2019-12-06 PROCEDURE — 83525 ASSAY OF INSULIN: CPT

## 2019-12-06 PROCEDURE — 84443 ASSAY THYROID STIM HORMONE: CPT

## 2019-12-06 PROCEDURE — 36415 COLL VENOUS BLD VENIPUNCTURE: CPT

## 2019-12-08 LAB — INSULIN P FAST SERPL-ACNC: 9 UIU/ML (ref 3–19)

## 2020-02-22 ENCOUNTER — HOSPITAL ENCOUNTER (OUTPATIENT)
Dept: LAB | Facility: MEDICAL CENTER | Age: 55
End: 2020-02-22
Attending: INTERNAL MEDICINE
Payer: COMMERCIAL

## 2020-02-22 LAB
ALBUMIN SERPL BCP-MCNC: 4.5 G/DL (ref 3.2–4.9)
ALBUMIN/GLOB SERPL: 1.6 G/DL
ALP SERPL-CCNC: 54 U/L (ref 30–99)
ALT SERPL-CCNC: 17 U/L (ref 2–50)
ANION GAP SERPL CALC-SCNC: 5 MMOL/L (ref 0–11.9)
AST SERPL-CCNC: 17 U/L (ref 12–45)
BILIRUB SERPL-MCNC: 0.7 MG/DL (ref 0.1–1.5)
BUN SERPL-MCNC: 13 MG/DL (ref 8–22)
CALCIUM SERPL-MCNC: 9.5 MG/DL (ref 8.5–10.5)
CHLORIDE SERPL-SCNC: 104 MMOL/L (ref 96–112)
CO2 SERPL-SCNC: 28 MMOL/L (ref 20–33)
CREAT SERPL-MCNC: 0.85 MG/DL (ref 0.5–1.4)
CREAT UR-MCNC: 63.2 MG/DL
GLOBULIN SER CALC-MCNC: 2.9 G/DL (ref 1.9–3.5)
GLUCOSE SERPL-MCNC: 97 MG/DL (ref 65–99)
PHOSPHATE SERPL-MCNC: 3.5 MG/DL (ref 2.5–4.5)
POTASSIUM SERPL-SCNC: 4.4 MMOL/L (ref 3.6–5.5)
PROT SERPL-MCNC: 7.4 G/DL (ref 6–8.2)
SODIUM SERPL-SCNC: 137 MMOL/L (ref 135–145)
T3FREE SERPL-MCNC: 4.56 PG/ML (ref 2.4–4.2)
T4 FREE SERPL-MCNC: 1.15 NG/DL (ref 0.53–1.43)
TSH SERPL DL<=0.005 MIU/L-ACNC: 0.01 UIU/ML (ref 0.38–5.33)

## 2020-02-22 PROCEDURE — 82340 ASSAY OF CALCIUM IN URINE: CPT

## 2020-02-22 PROCEDURE — 80053 COMPREHEN METABOLIC PANEL: CPT

## 2020-02-22 PROCEDURE — 36415 COLL VENOUS BLD VENIPUNCTURE: CPT

## 2020-02-22 PROCEDURE — 84481 FREE ASSAY (FT-3): CPT

## 2020-02-22 PROCEDURE — 84439 ASSAY OF FREE THYROXINE: CPT

## 2020-02-22 PROCEDURE — 84443 ASSAY THYROID STIM HORMONE: CPT

## 2020-02-22 PROCEDURE — 84100 ASSAY OF PHOSPHORUS: CPT

## 2020-02-22 PROCEDURE — 82570 ASSAY OF URINE CREATININE: CPT

## 2020-02-22 PROCEDURE — 84105 ASSAY OF URINE PHOSPHORUS: CPT

## 2020-02-25 LAB
CALCIUM 24H UR-MCNC: 4.1 MG/DL
CALCIUM 24H UR-MRATE: NORMAL MG/D
CALCIUM/CREAT 24H UR: 75 MG/G (ref 20–300)
COLLECT DURATION TIME SPEC: NORMAL HRS
COLLECT DURATION TIME SPEC: NORMAL HRS
CREAT 24H UR-MCNC: 55 MG/DL
CREAT 24H UR-MRATE: NORMAL MG/D (ref 500–1400)
PHOSPHATE 24H UR-MCNC: 18 MG/DL
PHOSPHATE 24H UR-MRATE: NORMAL MG/D (ref 400–1300)
PHOSPHATE/CREAT 24H UR: 327 MG/G
SPECIMEN VOL ?TM UR: NORMAL ML
TOTAL VOLUME 1105: NORMAL ML

## 2020-03-11 ENCOUNTER — HOSPITAL ENCOUNTER (OUTPATIENT)
Dept: RADIOLOGY | Facility: MEDICAL CENTER | Age: 55
End: 2020-03-11
Attending: NURSE PRACTITIONER
Payer: COMMERCIAL

## 2020-03-11 DIAGNOSIS — R92.30 DENSE BREAST TISSUE: ICD-10-CM

## 2020-03-11 DIAGNOSIS — Z12.39 BREAST CANCER SCREENING: ICD-10-CM

## 2020-03-11 PROCEDURE — 77067 SCR MAMMO BI INCL CAD: CPT | Mod: 50

## 2020-06-04 PROBLEM — E78.2 MIXED HYPERLIPIDEMIA: Status: ACTIVE | Noted: 2020-06-04

## 2020-06-04 PROBLEM — R73.03 PREDIABETES: Status: ACTIVE | Noted: 2020-06-04

## 2020-06-05 ENCOUNTER — HOSPITAL ENCOUNTER (OUTPATIENT)
Dept: LAB | Facility: MEDICAL CENTER | Age: 55
End: 2020-06-05
Attending: PHYSICIAN ASSISTANT
Payer: COMMERCIAL

## 2020-06-05 ENCOUNTER — OFFICE VISIT (OUTPATIENT)
Dept: MEDICAL GROUP | Facility: PHYSICIAN GROUP | Age: 55
End: 2020-06-05
Payer: COMMERCIAL

## 2020-06-05 VITALS
SYSTOLIC BLOOD PRESSURE: 110 MMHG | DIASTOLIC BLOOD PRESSURE: 74 MMHG | HEART RATE: 74 BPM | HEIGHT: 62 IN | OXYGEN SATURATION: 96 % | RESPIRATION RATE: 16 BRPM | TEMPERATURE: 98.2 F | BODY MASS INDEX: 31.28 KG/M2 | WEIGHT: 170 LBS

## 2020-06-05 DIAGNOSIS — Z11.59 NEED FOR HEPATITIS C SCREENING TEST: ICD-10-CM

## 2020-06-05 DIAGNOSIS — E66.9 OBESITY (BMI 30-39.9): ICD-10-CM

## 2020-06-05 DIAGNOSIS — Z23 NEED FOR VACCINATION: ICD-10-CM

## 2020-06-05 DIAGNOSIS — R73.03 PREDIABETES: ICD-10-CM

## 2020-06-05 DIAGNOSIS — D25.9 UTERINE LEIOMYOMA, UNSPECIFIED LOCATION: ICD-10-CM

## 2020-06-05 DIAGNOSIS — E03.8 HYPOTHYROIDISM DUE TO HASHIMOTO'S THYROIDITIS: ICD-10-CM

## 2020-06-05 DIAGNOSIS — J45.20 MILD INTERMITTENT ASTHMA WITHOUT COMPLICATION: ICD-10-CM

## 2020-06-05 DIAGNOSIS — E78.2 MIXED HYPERLIPIDEMIA: ICD-10-CM

## 2020-06-05 DIAGNOSIS — E06.3 HYPOTHYROIDISM DUE TO HASHIMOTO'S THYROIDITIS: ICD-10-CM

## 2020-06-05 LAB
CHOLEST SERPL-MCNC: 179 MG/DL (ref 100–199)
EST. AVERAGE GLUCOSE BLD GHB EST-MCNC: 117 MG/DL
FASTING STATUS PATIENT QL REPORTED: NORMAL
HBA1C MFR BLD: 5.7 % (ref 0–5.6)
HCV AB SER QL: NORMAL
HDLC SERPL-MCNC: 56 MG/DL
LDLC SERPL CALC-MCNC: 98 MG/DL
TRIGL SERPL-MCNC: 124 MG/DL (ref 0–149)

## 2020-06-05 PROCEDURE — G0472 HEP C SCREEN HIGH RISK/OTHER: HCPCS

## 2020-06-05 PROCEDURE — 99214 OFFICE O/P EST MOD 30 MIN: CPT | Performed by: PHYSICIAN ASSISTANT

## 2020-06-05 PROCEDURE — 80061 LIPID PANEL: CPT

## 2020-06-05 PROCEDURE — 36415 COLL VENOUS BLD VENIPUNCTURE: CPT

## 2020-06-05 PROCEDURE — 83036 HEMOGLOBIN GLYCOSYLATED A1C: CPT

## 2020-06-05 RX ORDER — CALCITONIN SALMON 200 [IU]/.09ML
SPRAY, METERED NASAL
COMMUNITY
Start: 2020-05-02 | End: 2021-07-16

## 2020-06-05 RX ORDER — LIOTHYRONINE SODIUM 5 UG/1
TABLET ORAL
COMMUNITY
Start: 2020-05-08 | End: 2021-06-25 | Stop reason: DRUGHIGH

## 2020-06-05 SDOH — HEALTH STABILITY: MENTAL HEALTH: HOW MANY STANDARD DRINKS CONTAINING ALCOHOL DO YOU HAVE ON A TYPICAL DAY?: 1 OR 2

## 2020-06-05 SDOH — HEALTH STABILITY: MENTAL HEALTH: HOW OFTEN DO YOU HAVE 6 OR MORE DRINKS ON ONE OCCASION?: NEVER

## 2020-06-05 SDOH — HEALTH STABILITY: MENTAL HEALTH: HOW OFTEN DO YOU HAVE A DRINK CONTAINING ALCOHOL?: 2-4 TIMES A MONTH

## 2020-06-05 ASSESSMENT — FIBROSIS 4 INDEX: FIB4 SCORE: 0.63

## 2020-06-05 ASSESSMENT — PATIENT HEALTH QUESTIONNAIRE - PHQ9: CLINICAL INTERPRETATION OF PHQ2 SCORE: 0

## 2020-06-05 NOTE — ASSESSMENT & PLAN NOTE
Chronic condition, being followed by Dr. Mcclain.    Onset:   Last TSH level:     TSH   Date Value Ref Range Status   02/22/2020 0.010 (L) 0.380 - 5.330 uIU/mL Final     Comment:     Please note new reference ranges effective 12/14/2017 10:00 AM  Pregnant Females, 1st Trimester  0.050-3.700  Pregnant Females, 2nd Trimester  0.310-4.350  Pregnant Females, 3rd Trimester  0.410-5.180       Current Medication: Synthroid 100 mcg, cytomel 5 mcg daily.   Side effects to medication: none  Denies any  chronic fatigue, constipation or diarrhea, dry skin or excessive sweating, weight changes, heat/cold intolerance, or hair loss. She was having difficulty sleeping, so Dr. Mcclain added cytomel. Now sleeping is improved.    She is also on calcitonin 200 unit solution to help her with sleep as well.

## 2020-06-05 NOTE — ASSESSMENT & PLAN NOTE
Since she was here the last time, she is pretty stable in her weight. Since the hysterectomy she is fluctuating weight less. Trying to improve diet. Generally does well, up until two weeks ago. Exercise limited secondary to pandemic.

## 2020-06-05 NOTE — PROGRESS NOTES
CC:    Chief Complaint   Patient presents with   • Establish Care        HISTORY OF THE PRESENT ILLNESS: Patient is a 55 y.o. female. This pleasant patient is here today establish care and discuss chronic conditions.     Health Maintenance: Completed      Hypothyroidism due to Hashimoto's thyroiditis  Chronic condition, being followed by Dr. Mcclain.    Onset:   Last TSH level:     TSH   Date Value Ref Range Status   02/22/2020 0.010 (L) 0.380 - 5.330 uIU/mL Final     Comment:     Please note new reference ranges effective 12/14/2017 10:00 AM  Pregnant Females, 1st Trimester  0.050-3.700  Pregnant Females, 2nd Trimester  0.310-4.350  Pregnant Females, 3rd Trimester  0.410-5.180       Current Medication: Synthroid 100 mcg, cytomel 5 mcg daily.   Side effects to medication: none  Denies any  chronic fatigue, constipation or diarrhea, dry skin or excessive sweating, weight changes, heat/cold intolerance, or hair loss. She was having difficulty sleeping, so Dr. Mcclain added cytomel. Now sleeping is improved.    She is also on calcitonin 200 unit solution to help her with sleep as well.     Mild intermittent asthma without complication  Chronic condition. Onset in 1993. Asthmatic bronchitis when she was pregnant with her son. Uses rescue inhaler rarely. She uses singulair and this helps keep her asthma controlled.     Fibroid, uterine  Had hysterectomy, Dr. Goldman, 10/2019. Still has ovaries. Resolved fibroids.     Mixed hyperlipidemia  This is a chronic condition.   Latest Labs:   Lab Results   Component Value Date/Time    CHOLSTRLTOT 183 12/06/2019 08:16 AM     (H) 12/06/2019 08:16 AM    HDL 53 12/06/2019 08:16 AM    TRIGLYCERIDE 127 12/06/2019 08:16 AM      Medications: none now  Diet/Exercise: been challenging. Pandemic making it difficutly. Diet has been good up until 2 weeks ago.   Family History of high cholesterol or heart disease? High cholesterol  Risk calculator: The 10-year ASCVD risk score  (Cathy ORDONEZ Jr., et al., 2013) is: 1.5%       Obesity (BMI 30-39.9)  Since she was here the last time, she is pretty stable in her weight. Since the hysterectomy she is fluctuating weight less. Trying to improve diet. Generally does well, up until two weeks ago. Exercise limited secondary to pandemic.     Prediabetes  This is a chronic condition.  Last A1c:   Lab Results   Component Value Date/Time    HBA1C 5.7 (H) 12/06/2019 0816    AVGLUC 117 12/06/2019 0816     FH DM? None      Allergies: Patient has no known allergies.    Current Outpatient Medications Ordered in Epic   Medication Sig Dispense Refill   • Zoster Vac Recomb Adjuvanted (SHINGRIX) 50 MCG/0.5ML Recon Susp 0.5 mL by Intramuscular route Once for 1 dose. 0.5 mL 0   • levothyroxine (SYNTHROID) 100 MCG Tab Take 100 mcg by mouth Every morning on an empty stomach.     • montelukast (SINGULAIR) 10 MG Tab Take 10 mg by mouth every evening.     • vitamin D (CHOLECALCIFEROL) 1000 UNIT Tab Take 3,000 Units by mouth every evening.     • COLLAGEN PO Take 2 Tabs by mouth every evening.     • Multiple Vitamins-Minerals (ONE-A-DAY 50 PLUS PO) Take 1 Tab by mouth every evening.     • Calcium Carb-Cholecalciferol (CALCIUM 600 + D PO) Take 1 Tab by mouth every evening.     • liothyronine (CYTOMEL) 5 MCG Tab TK 4 TS PO QHS     • calcitonin, salmon, (MIACALCIN) 200 UNIT/ACT Solution SPR ONCE IN 1 NOSTRIL ONCE HS     • Probiotic Product (PROBIOTIC DAILY PO) Take 2 Tabs by mouth every evening.       No current Epic-ordered facility-administered medications on file.        Past Medical History:   Diagnosis Date   • Asthma     inhaler PRN    • Bronchitis 2015   • Thyroid disease        Past Surgical History:   Procedure Laterality Date   • HYSTERECTOMY ROBOTIC XI N/A 10/2/2019    Procedure: HYSTERECTOMY, ROBOT-ASSISTED, USING DA KEKE XI;  Surgeon: Leslie Medina M.D.;  Location: SURGERY [facility];  Service: Gynecology   • CYSTOSCOPY N/A 10/2/2019    Procedure:  "CYSTOSCOPY;  Surgeon: Leslie Medina M.D.;  Location: SURGERY [facility];  Service: Gynecology   • SALPINGECTOMY Bilateral 10/2/2019    Procedure: SALPINGECTOMY;  Surgeon: Leslie Medina M.D.;  Location: SURGERY [facility];  Service: Gynecology       Social History     Tobacco Use   • Smoking status: Never Smoker   • Smokeless tobacco: Never Used   Substance Use Topics   • Alcohol use: Yes     Frequency: 2-4 times a month     Drinks per session: 1 or 2     Binge frequency: Never   • Drug use: No       Social History     Social History Narrative     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   Resp: Patient denies cough, wheezing, or shortness of breath.   CV: Patient denies any chest pain, claudication, cyanosis, palpitations, or edema.   GI: Patient denies any constipation, nausea, vomiting, diarrhea, bloating, abdominal pain, or dyspepsia.   MSK: Patient denies any joint pain, cramps, swelling, weakness, stiffness, or tremor  Skin: Patient denies any color changes, skin changes, lesions, ulcerations, wounds, pruritus, hair or nail changes.   Psych: Patient denies any suicidal or homicidal ideation, depression or anxiety.       Exam: /74   Pulse 74   Temp 36.8 °C (98.2 °F) (Temporal)   Resp 16   Ht 1.575 m (5' 2\")   Wt 77.1 kg (170 lb)   SpO2 96%  Body mass index is 31.09 kg/m².      GENERAL: No acute distress, appropriately dressed. Well developed female.   HENT: Atraumatic, normocephalic, EAC's clear without impaction. TM's pearly gray and translucent. Nose is patent. Mucosa is pink and moist. Lips are pink without lesions. Uvula " midline.   EYES: Extraocular movements intact, pupils equal and reactive to light  NECK: Supple, FROM. No thyromegaly appreciated. No lymphadenopathy (submandibular, anterior/posterior cervical, and supraclavicular lymph nodes palpated) or masses. No bruits appreciated.   CHEST: No deformities, Equal chest expansion  HEART: Regular rate and rhythm, no murmur  RESP: Clear to auscultation bilaterally without wheezes, rales or rhonchi.   ABD: Soft, Nontender, Non-Distended  Extremities: No Clubbing, Cyanosis, or Edema  Skin: Warm/dry, no rashes.   Neuro: A/O x 4, CN 2-12 Grossly intact, Motor/sensory grossly intact  Psych: Normal behavior, normal affect      Assessment/Plan:  1. Prediabetes  - HEMOGLOBIN A1C; Future  Chronic condition.  Needs updated labs now.  Dr. Mcclain is not following.  2. Mixed hyperlipidemia  - Lipid Profile; Future  Chronic condition.  No medications at this time.  Needs updated labs.  3. Hypothyroidism due to Hashimoto's thyroiditis  Chronic condition.  Being followed by Dr. Mcclain.  Recommended she touch base with him to see if she needs updated labs.  Reviewed previous labs from February with her.  She last saw Dr. Mcclain about 6 weeks ago.  4. Mild intermittent asthma without complication  Chronic condition.  Very stable.  Continue montelukast 10 mg a day, has rescue kneeler if needed.  5. Uterine leiomyoma, unspecified location  Resolved.  Status post hysterectomy 2018.  Still has her ovaries.  6. Obesity (BMI 30-39.9)  This is been an ongoing condition for the patient, since the hysterectomy her weight has stabilized.  She is working towards a balanced diet, and she has just recently got gym equipment for home.  7. Need for hepatitis C screening test  - HCV Scrn ( [id number] [employer]); Future    8. Need for vaccination  - Zoster Vac Recomb Adjuvanted (SHINGRIX) 50 MCG/0.5ML Recon Susp; 0.5 mL by Intramuscular route Once for 1 dose.  Dispense: 0.5 mL; Refill: 0    Other  orders  - liothyronine (CYTOMEL) 5 MCG Tab; TK 4 TS PO QHS  - calcitonin, salmon, (MIACALCIN) 200 UNIT/ACT Solution; SPR ONCE IN 1 NOSTRIL ONCE HS       Follow-up: Return in about 3 months (around 9/5/2020) for Follow up.    Please note that this dictation was created using voice recognition software. I have made every reasonable attempt to correct obvious errors, but I expect that there are errors of grammar and possibly content that I did not discover before finalizing the note.

## 2020-06-05 NOTE — ASSESSMENT & PLAN NOTE
This is a chronic condition.  Last A1c:   Lab Results   Component Value Date/Time    HBA1C 5.7 (H) 12/06/2019 0816    AVGLUC 117 12/06/2019 0816     FH DM? None

## 2020-06-05 NOTE — ASSESSMENT & PLAN NOTE
This is a chronic condition.   Latest Labs:   Lab Results   Component Value Date/Time    CHOLSTRLTOT 183 12/06/2019 08:16 AM     (H) 12/06/2019 08:16 AM    HDL 53 12/06/2019 08:16 AM    TRIGLYCERIDE 127 12/06/2019 08:16 AM      Medications: none now  Diet/Exercise: been challenging. Pandemic making it difficutly. Diet has been good up until 2 weeks ago.   Family History of high cholesterol or heart disease? High cholesterol  Risk calculator: The 10-year ASCVD risk score (Cathymauricio ORDONEZ Jr., et al., 2013) is: 1.5%

## 2020-06-05 NOTE — ASSESSMENT & PLAN NOTE
Chronic condition. Onset in 1993. Asthmatic bronchitis when she was pregnant with her son. Uses rescue inhaler rarely. She uses singulair and this helps keep her asthma controlled.

## 2020-06-08 ENCOUNTER — TELEPHONE (OUTPATIENT)
Dept: MEDICAL GROUP | Facility: PHYSICIAN GROUP | Age: 55
End: 2020-06-08

## 2020-06-08 NOTE — TELEPHONE ENCOUNTER
----- Message from Nan Garcia P.A.-C. sent at 6/8/2020 12:40 PM PDT -----  Please call patient about their results.     Results showed: Cholesterol look good!    Sugar levels are stable. Continue working on lifestyle modifications and limiting carb and sugar intake. Exercise regularly if you can.     Thank you,    Nan Garcia PA-C

## 2020-06-09 ENCOUNTER — PATIENT MESSAGE (OUTPATIENT)
Dept: MEDICAL GROUP | Facility: PHYSICIAN GROUP | Age: 55
End: 2020-06-09

## 2020-06-09 NOTE — TELEPHONE ENCOUNTER
2nd Attempt:  Left voicemail message for patient to call the office back at [phone]    Sent pt my chart message.

## 2020-07-28 ENCOUNTER — HOSPITAL ENCOUNTER (OUTPATIENT)
Dept: LAB | Facility: MEDICAL CENTER | Age: 55
End: 2020-07-28
Attending: INTERNAL MEDICINE
Payer: COMMERCIAL

## 2020-07-28 LAB
ALBUMIN SERPL BCP-MCNC: 4.3 G/DL (ref 3.2–4.9)
ALBUMIN/GLOB SERPL: 1.7 G/DL
ALP SERPL-CCNC: 112 U/L (ref 30–99)
ALT SERPL-CCNC: 76 U/L (ref 2–50)
ANION GAP SERPL CALC-SCNC: 12 MMOL/L (ref 7–16)
AST SERPL-CCNC: 48 U/L (ref 12–45)
BILIRUB SERPL-MCNC: 0.4 MG/DL (ref 0.1–1.5)
BUN SERPL-MCNC: 13 MG/DL (ref 8–22)
CALCIUM SERPL-MCNC: 9.8 MG/DL (ref 8.5–10.5)
CHLORIDE SERPL-SCNC: 101 MMOL/L (ref 96–112)
CO2 SERPL-SCNC: 26 MMOL/L (ref 20–33)
CREAT SERPL-MCNC: 0.73 MG/DL (ref 0.5–1.4)
CREAT UR-MCNC: 29.59 MG/DL
FASTING STATUS PATIENT QL REPORTED: NORMAL
GLOBULIN SER CALC-MCNC: 2.6 G/DL (ref 1.9–3.5)
GLUCOSE SERPL-MCNC: 102 MG/DL (ref 65–99)
PHOSPHATE SERPL-MCNC: 4 MG/DL (ref 2.5–4.5)
POTASSIUM SERPL-SCNC: 4.3 MMOL/L (ref 3.6–5.5)
POTASSIUM UR-SCNC: 18 MMOL/L
PROT SERPL-MCNC: 6.9 G/DL (ref 6–8.2)
SARS-COV-2 AB SERPL QL IA: NORMAL
SODIUM SERPL-SCNC: 139 MMOL/L (ref 135–145)
SODIUM UR-SCNC: 78 MMOL/L
T3FREE SERPL-MCNC: 4.58 PG/ML (ref 2–4.4)
T4 FREE SERPL-MCNC: 1.36 NG/DL (ref 0.93–1.7)
TSH SERPL DL<=0.005 MIU/L-ACNC: <0.005 UIU/ML (ref 0.38–5.33)

## 2020-07-28 PROCEDURE — 84443 ASSAY THYROID STIM HORMONE: CPT

## 2020-07-28 PROCEDURE — 86769 SARS-COV-2 COVID-19 ANTIBODY: CPT

## 2020-07-28 PROCEDURE — 36415 COLL VENOUS BLD VENIPUNCTURE: CPT

## 2020-07-28 PROCEDURE — 84105 ASSAY OF URINE PHOSPHORUS: CPT

## 2020-07-28 PROCEDURE — 84133 ASSAY OF URINE POTASSIUM: CPT

## 2020-07-28 PROCEDURE — 84100 ASSAY OF PHOSPHORUS: CPT

## 2020-07-28 PROCEDURE — 84300 ASSAY OF URINE SODIUM: CPT

## 2020-07-28 PROCEDURE — 82340 ASSAY OF CALCIUM IN URINE: CPT

## 2020-07-28 PROCEDURE — 82570 ASSAY OF URINE CREATININE: CPT

## 2020-07-28 PROCEDURE — 80053 COMPREHEN METABOLIC PANEL: CPT

## 2020-07-28 PROCEDURE — 84439 ASSAY OF FREE THYROXINE: CPT

## 2020-07-28 PROCEDURE — 84481 FREE ASSAY (FT-3): CPT

## 2020-07-30 LAB
CALCIUM 24H UR-MCNC: 10.2 MG/DL
CALCIUM 24H UR-MRATE: ABNORMAL MG/D
CALCIUM/CREAT 24H UR: 352 MG/G (ref 20–300)
COLLECT DURATION TIME SPEC: ABNORMAL HRS
COLLECT DURATION TIME SPEC: NORMAL HRS
CREAT 24H UR-MCNC: 29 MG/DL
CREAT 24H UR-MRATE: ABNORMAL MG/D (ref 500–1400)
PHOSPHATE 24H UR-MCNC: 13 MG/DL
PHOSPHATE 24H UR-MRATE: NORMAL MG/D (ref 400–1300)
PHOSPHATE/CREAT 24H UR: 448 MG/G
SPECIMEN VOL ?TM UR: ABNORMAL ML
TOTAL VOLUME 1105: NORMAL ML

## 2020-09-02 NOTE — ASSESSMENT & PLAN NOTE
This is a chronic condition.  Last A1c:   Lab Results   Component Value Date/Time    HBA1C 5.7 (H) 06/05/2020 1001    AVGLUC 117 06/05/2020 1001     Stable Hga1c.

## 2020-09-02 NOTE — ASSESSMENT & PLAN NOTE
This is a chronic condition.   Latest Labs:   Lab Results   Component Value Date/Time    CHOLSTRLTOT 179 06/05/2020 10:01 AM    LDL 98 06/05/2020 10:01 AM    HDL 56 06/05/2020 10:01 AM    TRIGLYCERIDE 124 06/05/2020 10:01 AM      Medications: none currently  Risk calculator: The 10-year ASCVD risk score (Cathy ORDONEZ Jr., et al., 2013) is: 1.4%

## 2020-09-04 ENCOUNTER — OFFICE VISIT (OUTPATIENT)
Dept: MEDICAL GROUP | Facility: PHYSICIAN GROUP | Age: 55
End: 2020-09-04
Payer: COMMERCIAL

## 2020-09-04 VITALS
BODY MASS INDEX: 30.55 KG/M2 | SYSTOLIC BLOOD PRESSURE: 126 MMHG | HEART RATE: 70 BPM | HEIGHT: 62 IN | RESPIRATION RATE: 12 BRPM | DIASTOLIC BLOOD PRESSURE: 82 MMHG | TEMPERATURE: 97.7 F | OXYGEN SATURATION: 96 % | WEIGHT: 166 LBS

## 2020-09-04 DIAGNOSIS — M79.644 BILATERAL THUMB PAIN: ICD-10-CM

## 2020-09-04 DIAGNOSIS — Z23 NEED FOR VACCINATION: ICD-10-CM

## 2020-09-04 DIAGNOSIS — R79.89 ELEVATED LFTS: ICD-10-CM

## 2020-09-04 DIAGNOSIS — R73.03 PREDIABETES: ICD-10-CM

## 2020-09-04 DIAGNOSIS — E03.8 HYPOTHYROIDISM DUE TO HASHIMOTO'S THYROIDITIS: ICD-10-CM

## 2020-09-04 DIAGNOSIS — E06.3 HYPOTHYROIDISM DUE TO HASHIMOTO'S THYROIDITIS: ICD-10-CM

## 2020-09-04 DIAGNOSIS — E78.00 PURE HYPERCHOLESTEROLEMIA: ICD-10-CM

## 2020-09-04 DIAGNOSIS — M79.645 BILATERAL THUMB PAIN: ICD-10-CM

## 2020-09-04 PROCEDURE — 90715 TDAP VACCINE 7 YRS/> IM: CPT | Performed by: PHYSICIAN ASSISTANT

## 2020-09-04 PROCEDURE — 99214 OFFICE O/P EST MOD 30 MIN: CPT | Mod: 25 | Performed by: PHYSICIAN ASSISTANT

## 2020-09-04 PROCEDURE — 90471 IMMUNIZATION ADMIN: CPT | Performed by: PHYSICIAN ASSISTANT

## 2020-09-04 ASSESSMENT — FIBROSIS 4 INDEX: FIB4 SCORE: 0.84

## 2020-09-04 NOTE — ASSESSMENT & PLAN NOTE
Reviewed labs from Dr. Clinton.  Patient had new elevated LFTs.  Denies any alcohol use or excessive Tylenol use.  Denies any recent illness.  Discussed her again her thyroid labs with her today.  Recommend she repeat her LFTs in 4 weeks if still elevated would recommend a full work-up on this matter.

## 2020-09-04 NOTE — ASSESSMENT & PLAN NOTE
Patient complains of the beginning of some arthritic changes in her thumbs bilaterally.  No significant pain.  No medications taken for it at this time.  No radiation of the pain.  Occasionally feels ache at her DIP joint.

## 2020-09-04 NOTE — ASSESSMENT & PLAN NOTE
Being followed by Dr. Brock.  Reviewed labs with patient today.  Advised her she should contact her endocrinologist about lowering her thyroid dosage.  Her T3 was elevated and her TSH was quite low.  Discussed risks of overmedicated thyroid.  She denies any palpitations, unintentional weight loss, excessive sweating, diarrhea.  She tends to do better with Cytomel, it would be my recommendation she lower her levothyroxine to 88 mcg and continue Cytomel 15 mcg a day.  She will call her endocrinologist on this matter.

## 2020-09-04 NOTE — PROGRESS NOTES
CC:   Chief Complaint   Patient presents with   • Finger Pain     bilateral thumb   • Prediabetes     f/v          HISTORY OF PRESENT ILLNESS: Patient is a 55 y.o. female established patient who presents today to follow-up and review labs with me today.Completed    Health Maintenance: Completed  We will update tetanus shot today.  Recommend Zostavax and flu vaccine.    Pure hypercholesterolemia  This is a chronic condition.   Latest Labs:   Lab Results   Component Value Date/Time    CHOLSTRLTOT 179 06/05/2020 10:01 AM    LDL 98 06/05/2020 10:01 AM    HDL 56 06/05/2020 10:01 AM    TRIGLYCERIDE 124 06/05/2020 10:01 AM      Medications: none currently  Risk calculator: The 10-year ASCVD risk score (Cathymauricio ORDONEZ Jr., et al., 2013) is: 1.4%       Prediabetes  This is a chronic condition.  Last A1c:   Lab Results   Component Value Date/Time    HBA1C 5.7 (H) 06/05/2020 1001    AVGLUC 117 06/05/2020 1001     Stable Hga1c.     Bilateral thumb pain  Patient complains of the beginning of some arthritic changes in her thumbs bilaterally.  No significant pain.  No medications taken for it at this time.  No radiation of the pain.  Occasionally feels ache at her DIP joint.    Hypothyroidism due to Hashimoto's thyroiditis  Being followed by Dr. Brock.  Reviewed labs with patient today.  Advised her she should contact her endocrinologist about lowering her thyroid dosage.  Her T3 was elevated and her TSH was quite low.  Discussed risks of overmedicated thyroid.  She denies any palpitations, unintentional weight loss, excessive sweating, diarrhea.  She tends to do better with Cytomel, it would be my recommendation she lower her levothyroxine to 88 mcg and continue Cytomel 15 mcg a day.  She will call her endocrinologist on this matter.    Elevated LFTs  Reviewed labs from Dr. Clinton.  Patient had new elevated LFTs.  Denies any alcohol use or excessive Tylenol use.  Denies any recent illness.  Discussed her again her thyroid  labs with her today.  Recommend she repeat her LFTs in 4 weeks if still elevated would recommend a full work-up on this matter.      Patient Active Problem List    Diagnosis Date Noted   • Bilateral thumb pain 09/04/2020   • Elevated LFTs 09/04/2020   • Prediabetes 06/04/2020   • Mixed hyperlipidemia 06/04/2020   • Fibroid, uterine 02/02/2018   • Obesity (BMI 30-39.9) 02/02/2018   • Hypothyroidism due to Hashimoto's thyroiditis 02/02/2018   • Pure hypercholesterolemia 02/02/2018   • Snoring 02/02/2018   • Perimenopausal 01/06/2017   • Mild intermittent asthma without complication 01/05/2017      Allergies:Patient has no known allergies.    Current Outpatient Medications   Medication Sig Dispense Refill   • liothyronine (CYTOMEL) 5 MCG Tab TK 4 TS PO QHS     • calcitonin, salmon, (MIACALCIN) 200 UNIT/ACT Solution SPR ONCE IN 1 NOSTRIL ONCE HS     • levothyroxine (SYNTHROID) 100 MCG Tab Take 100 mcg by mouth Every morning on an empty stomach.     • montelukast (SINGULAIR) 10 MG Tab Take 10 mg by mouth every evening.     • vitamin D (CHOLECALCIFEROL) 1000 UNIT Tab Take 3,000 Units by mouth every evening.     • Probiotic Product (PROBIOTIC DAILY PO) Take 2 Tabs by mouth every evening.     • COLLAGEN PO Take 2 Tabs by mouth every evening.     • Multiple Vitamins-Minerals (ONE-A-DAY 50 PLUS PO) Take 1 Tab by mouth every evening.     • Calcium Carb-Cholecalciferol (CALCIUM 600 + D PO) Take 1 Tab by mouth every evening.       No current facility-administered medications for this visit.        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  Skin: No rashes  Neuro: No Headaches  Psych: No Suicidal ideations    All remaining systems reviewed and found to be negative, except as stated above.    Exam:    /82 (BP Location: Left arm, Patient Position: Sitting, BP Cuff Size: Adult)   Pulse 70   Temp 36.5 °C (97.7 °F) (Temporal)   Resp 12   Ht 1.575 m (5' 2\")   Wt 75.3 kg (166 lb)   SpO2 96%  Body mass index is 30.36 kg/m².    General:  Well nourished, well developed female in NAD  HENT: Atraumatic, normocephalic  EYES: Extraocular movements intact.   NECK: Supple, FROM  CHEST: No deformities, Equal chest expansion  RESP: Unlabored, no stridor or audible wheeze  HEART: Regular Rate and rhythm.   ABD: Soft, Nontender, Non-Distended  Extremities: No Clubbing, Cyanosis, or Edema.  Minor bony protuberance of the thumbs medially over DIP joint bilaterally.  Nontender to palpation.  Full range of motion of all fingers and joints bilaterally.  No erythema or edema.  Skin: Warm/dry, without rashes  Neuro: A/O x 4, due to COVID-19- did not have patient remove face mask to test cranial nerves.  Motor/sensory grossly intact  Psych: Normal behavior, normal affect      Lab review:  Labs are reviewed and discussed with a patient  Lab Results   Component Value Date/Time    CHOLSTRLTOT 179 06/05/2020 10:01 AM    LDL 98 06/05/2020 10:01 AM    HDL 56 06/05/2020 10:01 AM    TRIGLYCERIDE 124 06/05/2020 10:01 AM       Lab Results   Component Value Date/Time    SODIUM 139 07/28/2020 08:03 AM    POTASSIUM 4.3 07/28/2020 08:03 AM    CHLORIDE 101 07/28/2020 08:03 AM    CO2 26 " 07/28/2020 08:03 AM    GLUCOSE 102 (H) 07/28/2020 08:03 AM    BUN 13 07/28/2020 08:03 AM    CREATININE 0.73 07/28/2020 08:03 AM     Lab Results   Component Value Date/Time    ALKPHOSPHAT 112 (H) 07/28/2020 08:03 AM    ASTSGOT 48 (H) 07/28/2020 08:03 AM    ALTSGPT 76 (H) 07/28/2020 08:03 AM    TBILIRUBIN 0.4 07/28/2020 08:03 AM      Lab Results   Component Value Date/Time    HBA1C 5.7 (H) 06/05/2020 10:01 AM    HBA1C 5.7 (H) 12/06/2019 08:16 AM     No results found for: TSH  Lab Results   Component Value Date/Time    FREET4 1.36 07/28/2020 08:03 AM    FREET4 1.15 02/22/2020 11:09 AM       Lab Results   Component Value Date/Time    WBC 11.8 (H) 09/13/2019 02:51 PM    RBC 4.26 09/13/2019 02:51 PM    HEMOGLOBIN 13.3 09/13/2019 02:51 PM    HEMATOCRIT 40.3 09/13/2019 02:51 PM    MCV 94.6 09/13/2019 02:51 PM    MCH 31.2 09/13/2019 02:51 PM    MCHC 33.0 (L) 09/13/2019 02:51 PM    MPV 10.5 09/13/2019 02:51 PM    NEUTSPOLYS 47.40 04/18/2019 06:36 AM    LYMPHOCYTES 40.50 04/18/2019 06:36 AM    MONOCYTES 8.00 04/18/2019 06:36 AM    EOSINOPHILS 3.10 04/18/2019 06:36 AM    BASOPHILS 0.80 04/18/2019 06:36 AM        Assessment/Plan:  1. Pure hypercholesterolemia  Chronic condition, target labs today.  Repeat in a year.  2. Prediabetes  Hemoglobin A1c slightly elevated at 5.7 but stable.  We will repeat in 6 months.  Repeat in office.  3. Need for vaccination  - TDAP VACCINE =>6YO IM    4. Elevated LFTs  - HEPATIC FUNCTION PANEL; Future  Per Dr. Clinton's labs from August 2020 LFTs elevated.  No recent illness, could be associated with her thyroid.  Recommend that she repeats her labs in 4 to 6 weeks if still elevated would recommend a full work-up at that time.  Abdomen nontender on exam today.  5. Bilateral thumb pain  Mild arthritis developing bilateral thumbs.  Recommended glucosamine and costochondroitin supplementation.  Active movement of the joints as well.  If symptoms progressively get worse would recommend a hand  specialist.  6. Hypothyroidism due to Hashimoto's thyroiditis  Again recommend the patient call her endocrinologist to discuss her medication regimen, TSH too low and T3 elevated.  She has no acute or concerning symptoms of hyperthyroidism, discussed the risks with her today.  She will call her endocrinologist on this matter.    Follow-up: Return in about 6 months (around 3/4/2021) for F/u and Hga1c in office. .    Please note that this dictation was created using voice recognition software. I have made every reasonable attempt to correct obvious errors, but I expect that there are errors of grammar and possibly content that I did not discover before finalizing the note.

## 2020-10-06 DIAGNOSIS — M79.644 BILATERAL THUMB PAIN: ICD-10-CM

## 2020-10-06 DIAGNOSIS — M79.645 BILATERAL THUMB PAIN: ICD-10-CM

## 2020-10-28 ENCOUNTER — HOSPITAL ENCOUNTER (OUTPATIENT)
Dept: LAB | Facility: MEDICAL CENTER | Age: 55
End: 2020-10-28
Attending: INTERNAL MEDICINE
Payer: COMMERCIAL

## 2020-10-28 ENCOUNTER — HOSPITAL ENCOUNTER (OUTPATIENT)
Dept: LAB | Facility: MEDICAL CENTER | Age: 55
End: 2020-10-28
Attending: PHYSICIAN ASSISTANT
Payer: COMMERCIAL

## 2020-10-28 ENCOUNTER — TELEPHONE (OUTPATIENT)
Dept: MEDICAL GROUP | Facility: PHYSICIAN GROUP | Age: 55
End: 2020-10-28

## 2020-10-28 DIAGNOSIS — R79.89 ELEVATED LFTS: ICD-10-CM

## 2020-10-28 LAB
ALBUMIN SERPL BCP-MCNC: 4.3 G/DL (ref 3.2–4.9)
ALBUMIN SERPL BCP-MCNC: 4.4 G/DL (ref 3.2–4.9)
ALBUMIN/GLOB SERPL: 1.6 G/DL
ALP SERPL-CCNC: 76 U/L (ref 30–99)
ALP SERPL-CCNC: 77 U/L (ref 30–99)
ALT SERPL-CCNC: 31 U/L (ref 2–50)
ALT SERPL-CCNC: 32 U/L (ref 2–50)
ANION GAP SERPL CALC-SCNC: 10 MMOL/L (ref 7–16)
AST SERPL-CCNC: 29 U/L (ref 12–45)
AST SERPL-CCNC: 33 U/L (ref 12–45)
BILIRUB CONJ SERPL-MCNC: <0.2 MG/DL (ref 0.1–0.5)
BILIRUB INDIRECT SERPL-MCNC: NORMAL MG/DL (ref 0–1)
BILIRUB SERPL-MCNC: 0.3 MG/DL (ref 0.1–1.5)
BILIRUB SERPL-MCNC: 0.3 MG/DL (ref 0.1–1.5)
BUN SERPL-MCNC: 16 MG/DL (ref 8–22)
CALCIUM SERPL-MCNC: 9.8 MG/DL (ref 8.5–10.5)
CHLORIDE SERPL-SCNC: 102 MMOL/L (ref 96–112)
CO2 SERPL-SCNC: 26 MMOL/L (ref 20–33)
CREAT SERPL-MCNC: 0.84 MG/DL (ref 0.5–1.4)
CREAT UR-MCNC: 141.09 MG/DL
FASTING STATUS PATIENT QL REPORTED: NORMAL
GLOBULIN SER CALC-MCNC: 2.7 G/DL (ref 1.9–3.5)
GLUCOSE SERPL-MCNC: 100 MG/DL (ref 65–99)
PHOSPHATE SERPL-MCNC: 3.9 MG/DL (ref 2.5–4.5)
POTASSIUM SERPL-SCNC: 4.6 MMOL/L (ref 3.6–5.5)
PROT SERPL-MCNC: 7 G/DL (ref 6–8.2)
PROT SERPL-MCNC: 7.1 G/DL (ref 6–8.2)
SODIUM SERPL-SCNC: 138 MMOL/L (ref 135–145)

## 2020-10-28 PROCEDURE — 81256 HFE GENE: CPT

## 2020-10-28 PROCEDURE — 36415 COLL VENOUS BLD VENIPUNCTURE: CPT

## 2020-10-28 PROCEDURE — 84105 ASSAY OF URINE PHOSPHORUS: CPT

## 2020-10-28 PROCEDURE — 84100 ASSAY OF PHOSPHORUS: CPT

## 2020-10-28 PROCEDURE — 82570 ASSAY OF URINE CREATININE: CPT

## 2020-10-28 PROCEDURE — 80053 COMPREHEN METABOLIC PANEL: CPT

## 2020-10-28 PROCEDURE — 82340 ASSAY OF CALCIUM IN URINE: CPT

## 2020-10-28 PROCEDURE — 80076 HEPATIC FUNCTION PANEL: CPT

## 2020-10-30 LAB
CALCIUM 24H UR-MCNC: 60.3 MG/DL
CALCIUM 24H UR-MRATE: ABNORMAL MG/D
CALCIUM/CREAT 24H UR: 450 MG/G (ref 20–300)
COLLECT DURATION TIME SPEC: ABNORMAL HRS
COLLECT DURATION TIME SPEC: NORMAL HRS
CREAT 24H UR-MCNC: 134 MG/DL
CREAT 24H UR-MRATE: ABNORMAL MG/D (ref 500–1400)
PHOSPHATE 24H UR-MCNC: 54 MG/DL
PHOSPHATE 24H UR-MRATE: NORMAL MG/D (ref 400–1300)
PHOSPHATE/CREAT 24H UR: 403 MG/G
SPECIMEN VOL ?TM UR: ABNORMAL ML
TOTAL VOLUME 1105: NORMAL ML

## 2020-11-02 LAB
HFE GENE MUT ANL BLD/T: NORMAL
HFE P.C282Y BLD/T QL: NEGATIVE
HFE P.H63D BLD/T QL: NORMAL
HFE P.S65C BLD/T QL: NEGATIVE

## 2021-03-15 DIAGNOSIS — Z23 NEED FOR VACCINATION: ICD-10-CM

## 2021-03-16 RX ORDER — MONTELUKAST SODIUM 10 MG/1
10 TABLET ORAL EVERY EVENING
Qty: 90 TABLET | Refills: 1 | Status: SHIPPED | OUTPATIENT
Start: 2021-03-16 | End: 2021-09-09

## 2021-03-19 ENCOUNTER — HOSPITAL ENCOUNTER (OUTPATIENT)
Dept: RADIOLOGY | Facility: MEDICAL CENTER | Age: 56
End: 2021-03-19
Attending: PHYSICIAN ASSISTANT
Payer: COMMERCIAL

## 2021-03-19 ENCOUNTER — TELEPHONE (OUTPATIENT)
Dept: MEDICAL GROUP | Facility: PHYSICIAN GROUP | Age: 56
End: 2021-03-19

## 2021-03-19 DIAGNOSIS — Z12.31 VISIT FOR SCREENING MAMMOGRAM: ICD-10-CM

## 2021-03-19 PROCEDURE — 77063 BREAST TOMOSYNTHESIS BI: CPT

## 2021-03-19 NOTE — TELEPHONE ENCOUNTER
Patient was notified about her Mammogram results and will be sending Nan a Mychart about getting the order for the Test to check further on her Density breasts. She had one 5 years ago and would like to get another one soon.

## 2021-03-19 NOTE — TELEPHONE ENCOUNTER
----- Message from Nan Garcia P.A.-C. sent at 3/19/2021  3:54 PM PDT -----  Recent mammogram is negative for obvious abnormalities, but she has dense breasts which can lower sensitivity of the test. Recommend routine screening in one year.  There is additional breast imaging, Sonocine, that can be performed in addition to the mammogram to further evaluate those with dense breasts. Unfortunately, most insurances may not cover this exam.  Please let me know if patient is interested in test and I can place an order.    Thank you,     Nan Garcia PA-C

## 2021-06-11 ENCOUNTER — OFFICE VISIT (OUTPATIENT)
Dept: MEDICAL GROUP | Facility: PHYSICIAN GROUP | Age: 56
End: 2021-06-11
Payer: COMMERCIAL

## 2021-06-11 VITALS
SYSTOLIC BLOOD PRESSURE: 122 MMHG | HEART RATE: 64 BPM | OXYGEN SATURATION: 96 % | DIASTOLIC BLOOD PRESSURE: 80 MMHG | TEMPERATURE: 97.6 F | RESPIRATION RATE: 12 BRPM | WEIGHT: 172 LBS | HEIGHT: 61 IN | BODY MASS INDEX: 32.47 KG/M2

## 2021-06-11 DIAGNOSIS — R73.03 PREDIABETES: ICD-10-CM

## 2021-06-11 DIAGNOSIS — E03.8 HYPOTHYROIDISM DUE TO HASHIMOTO'S THYROIDITIS: ICD-10-CM

## 2021-06-11 DIAGNOSIS — E78.2 MIXED HYPERLIPIDEMIA: ICD-10-CM

## 2021-06-11 DIAGNOSIS — Z13.6 SCREENING FOR CARDIOVASCULAR CONDITION: ICD-10-CM

## 2021-06-11 DIAGNOSIS — E06.3 HYPOTHYROIDISM DUE TO HASHIMOTO'S THYROIDITIS: ICD-10-CM

## 2021-06-11 DIAGNOSIS — Z13.0 SCREENING FOR DEFICIENCY ANEMIA: ICD-10-CM

## 2021-06-11 DIAGNOSIS — R53.83 FATIGUE, UNSPECIFIED TYPE: ICD-10-CM

## 2021-06-11 DIAGNOSIS — E55.9 VITAMIN D DEFICIENCY: ICD-10-CM

## 2021-06-11 DIAGNOSIS — R79.89 ELEVATED LFTS: ICD-10-CM

## 2021-06-11 PROCEDURE — 99214 OFFICE O/P EST MOD 30 MIN: CPT | Performed by: PHYSICIAN ASSISTANT

## 2021-06-11 RX ORDER — MELOXICAM 15 MG/1
TABLET ORAL
COMMUNITY

## 2021-06-11 RX ORDER — LEVOTHYROXINE SODIUM 0.1 MG/1
TABLET ORAL
COMMUNITY
End: 2021-06-11

## 2021-06-11 RX ORDER — IBUPROFEN 200 MG
TABLET ORAL
COMMUNITY
End: 2021-06-11

## 2021-06-11 RX ORDER — TRAMADOL HYDROCHLORIDE 50 MG/1
TABLET ORAL
COMMUNITY
End: 2021-06-11

## 2021-06-11 RX ORDER — CYANOCOBALAMIN 1000 UG/ML
INJECTION, SOLUTION INTRAMUSCULAR; SUBCUTANEOUS
COMMUNITY
End: 2021-06-11

## 2021-06-11 RX ORDER — CYANOCOBALAMIN 1000 UG/ML
INJECTION, SOLUTION INTRAMUSCULAR; SUBCUTANEOUS
COMMUNITY
Start: 2021-04-03 | End: 2021-12-15

## 2021-06-11 RX ORDER — PROGESTERONE 100 MG/1
CAPSULE ORAL
COMMUNITY
End: 2021-06-11

## 2021-06-11 ASSESSMENT — FIBROSIS 4 INDEX: FIB4 SCORE: 0.91

## 2021-06-11 ASSESSMENT — PATIENT HEALTH QUESTIONNAIRE - PHQ9: CLINICAL INTERPRETATION OF PHQ2 SCORE: 0

## 2021-06-11 NOTE — ASSESSMENT & PLAN NOTE
This is a chronic condition.  Last A1c:   Lab Results   Component Value Date/Time    HBA1C 5.7 (H) 06/05/2020 1001    AVGLUC 117 06/05/2020 1001       FH DM? no

## 2021-06-11 NOTE — PROGRESS NOTES
CC:   Chief Complaint   Patient presents with   • Annual Exam   • Hypothyroidism          HISTORY OF PRESENT ILLNESS: Patient is a 56 y.o. female established patient who presents today to follow up on the following conditions:       Health Maintenance: Completed  Had chickenpox as child, discussed shingles vaccine.     Hypothyroidism due to Hashimoto's thyroiditis  Patient is followed by Dr. Brock. She is due to follow up labs. Currently she in levothyroxine 100 mcg daily and cytomel 3- 5 mcg tabs nightly.     Mixed hyperlipidemia  This is a chronic condition.   Latest Labs:   Lab Results   Component Value Date/Time    CHOLSTRLTOT 179 06/05/2020 10:01 AM    LDL 98 06/05/2020 10:01 AM    HDL 56 06/05/2020 10:01 AM    TRIGLYCERIDE 124 06/05/2020 10:01 AM      Medications: none    Risk calculator: The 10-year ASCVD risk score (Cathy ORDONEZ Jr., et al., 2013) is: 2%       Prediabetes  This is a chronic condition.  Last A1c:   Lab Results   Component Value Date/Time    HBA1C 5.7 (H) 06/05/2020 1001    AVGLUC 117 06/05/2020 1001       FH DM? no      Elevated LFTs  Resolved 10/20.       Patient Active Problem List    Diagnosis Date Noted   • Bilateral thumb pain 09/04/2020   • Elevated LFTs 09/04/2020   • Prediabetes 06/04/2020   • Mixed hyperlipidemia 06/04/2020   • Fibroid, uterine 02/02/2018   • Obesity (BMI 30-39.9) 02/02/2018   • Hypothyroidism due to Hashimoto's thyroiditis 02/02/2018   • Pure hypercholesterolemia 02/02/2018   • Snoring 02/02/2018   • Perimenopausal 01/06/2017   • Mild intermittent asthma without complication 01/05/2017      Allergies:Patient has no known allergies.    Current Outpatient Medications   Medication Sig Dispense Refill   • cyanocobalamin (VITAMIN B-12) 1000 MCG/ML Solution      • meloxicam (MOBIC) 15 MG tablet meloxicam 15 mg tablet     • montelukast (SINGULAIR) 10 MG Tab Take 1 tablet by mouth every evening. 90 tablet 1   • liothyronine (CYTOMEL) 5 MCG Tab TK 4 TS PO QHS     •  "calcitonin, salmon, (MIACALCIN) 200 UNIT/ACT Solution SPR ONCE IN 1 NOSTRIL ONCE HS     • levothyroxine (SYNTHROID) 100 MCG Tab Take 100 mcg by mouth every morning on an empty stomach. BRAND NAME     • vitamin D (CHOLECALCIFEROL) 1000 UNIT Tab Take 3,000 Units by mouth every evening.     • Probiotic Product (PROBIOTIC DAILY PO) Take 2 Tabs by mouth every evening.     • Multiple Vitamins-Minerals (ONE-A-DAY 50 PLUS PO) Take 1 Tab by mouth every evening.     • Calcium Carb-Cholecalciferol (CALCIUM 600 + D PO) Take 1 Tab by mouth every evening.       No current facility-administered medications for this visit.       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        Family History   Problem Relation Age of Onset   • Arthritis Mother    • Hypertension Mother    • Hyperlipidemia Mother    • Arthritis Father    • Hypertension Father    • Hyperlipidemia Father    • Cancer Paternal Aunt         breast cancer       Review of Systems:    Constitutional: No Fevers, Chills  Eyes: No vision changes  ENT: No hearing changes  Resp: No Shortness of breath  CV: No Chest pain  GI: No Nausea/Vomiting  MSK: No weakness  Skin: No rashes  Neuro: No Headaches  Psych: No Suicidal ideations    All remaining systems reviewed and found to be negative, except as stated above.    Exam:    /80   Pulse 64   Temp 36.4 °C (97.6 °F) (Temporal)   Resp 12   Ht 1.549 m (5' 1\")   Wt 78 kg (172 lb)   SpO2 96%  Body mass index is 32.5 kg/m².    General:  Well nourished, well developed female in NAD  HENT: Atraumatic, normocephalic  EYES: Extraocular movements intact  NECK: Supple, FROM  CHEST: No deformities, Equal chest expansion  RESP: Unlabored, no stridor or audible wheeze  HEART: Regular Rate and " rhythm.   Extremities: No Clubbing, Cyanosis, or Edema  Skin: Warm/dry, without rashes  Neuro: A/O x 4, due to COVID-19- did not have patient remove face mask to test cranial nerves.  Motor/sensory grossly intact  Psych: Normal behavior, normal affect      Lab review:  Labs are reviewed and discussed with a patient  Lab Results   Component Value Date/Time    CHOLSTRLTOT 179 06/05/2020 10:01 AM    LDL 98 06/05/2020 10:01 AM    HDL 56 06/05/2020 10:01 AM    TRIGLYCERIDE 124 06/05/2020 10:01 AM       Lab Results   Component Value Date/Time    SODIUM 138 10/28/2020 06:50 AM    POTASSIUM 4.6 10/28/2020 06:50 AM    CHLORIDE 102 10/28/2020 06:50 AM    CO2 26 10/28/2020 06:50 AM    GLUCOSE 100 (H) 10/28/2020 06:50 AM    BUN 16 10/28/2020 06:50 AM    CREATININE 0.84 10/28/2020 06:50 AM     Lab Results   Component Value Date/Time    ALKPHOSPHAT 77 10/28/2020 06:54 AM    ASTSGOT 33 10/28/2020 06:54 AM    ALTSGPT 32 10/28/2020 06:54 AM    TBILIRUBIN 0.3 10/28/2020 06:54 AM      Lab Results   Component Value Date/Time    HBA1C 5.7 (H) 06/05/2020 10:01 AM    HBA1C 5.7 (H) 12/06/2019 08:16 AM     No results found for: TSH  Lab Results   Component Value Date/Time    FREET4 1.36 07/28/2020 08:03 AM    FREET4 1.15 02/22/2020 11:09 AM       Lab Results   Component Value Date/Time    WBC 11.8 (H) 09/13/2019 02:51 PM    RBC 4.26 09/13/2019 02:51 PM    HEMOGLOBIN 13.3 09/13/2019 02:51 PM    HEMATOCRIT 40.3 09/13/2019 02:51 PM    MCV 94.6 09/13/2019 02:51 PM    MCH 31.2 09/13/2019 02:51 PM    MCHC 33.0 (L) 09/13/2019 02:51 PM    MPV 10.5 09/13/2019 02:51 PM    NEUTSPOLYS 47.40 04/18/2019 06:36 AM    LYMPHOCYTES 40.50 04/18/2019 06:36 AM    MONOCYTES 8.00 04/18/2019 06:36 AM    EOSINOPHILS 3.10 04/18/2019 06:36 AM    BASOPHILS 0.80 04/18/2019 06:36 AM          Assessment/Plan:  1. Hypothyroidism due to Hashimoto's thyroiditis  - TSH; Future  - T3 FREE; Future  - FREE THYROXINE; Future  - THYROID PEROXIDASE  (TPO) AB; Future  - REVERSE T3;  Future  Patient would like me to evaluate her thyroid for her, has not seen Dr. Mcclain in a year.  Patient continues levothyroxine 100 mcg daily and Cytomel 15 mcg nightly.  Discussed with patient her last TSH was quite low we may need to reduce her medications.  We will get full panel for further evaluation.  She would like to get a reverse T3 as well.  2. Mixed hyperlipidemia  - Lipid Profile; Future  3. Elevated LFTs  Resolved as of October 2020 due for updated labs.  4. Prediabetes  - HEMOGLOBIN A1C; Future  Hemoglobin A1c slightly elevated at 5.7 last labs.  Mother has the same issue but no diagnosis of diabetes.  5. Vitamin D deficiency  - VITAMIN D,25 HYDROXY; Future  History of vitamin D deficiency, she does supplement daily.  6. Fatigue, unspecified type  - VITAMIN B12; Future  Dr. Mcclain did put her on B12 supplementation 1000 mcg injection once a week.  7. Screening for deficiency anemia  - CBC WITHOUT DIFFERENTIAL; Future    8. Screening for cardiovascular condition  - Comp Metabolic Panel; Future    Other orders  - cyanocobalamin (VITAMIN B-12) 1000 MCG/ML Solution  - meloxicam (MOBIC) 15 MG tablet; meloxicam 15 mg tablet       Follow-up: Return in about 4 weeks (around 7/9/2021) for Follow up on labs.    Please note that this dictation was created using voice recognition software. I have made every reasonable attempt to correct obvious errors, but I expect that there are errors of grammar and possibly content that I did not discover before finalizing the note.

## 2021-06-11 NOTE — ASSESSMENT & PLAN NOTE
This is a chronic condition.   Latest Labs:   Lab Results   Component Value Date/Time    CHOLSTRLTOT 179 06/05/2020 10:01 AM    LDL 98 06/05/2020 10:01 AM    HDL 56 06/05/2020 10:01 AM    TRIGLYCERIDE 124 06/05/2020 10:01 AM      Medications: none    Risk calculator: The 10-year ASCVD risk score (Cathy ORDONEZ Jr., et al., 2013) is: 2%

## 2021-06-11 NOTE — ASSESSMENT & PLAN NOTE
Patient is followed by Dr. Brock. She is due to follow up labs. Currently she in levothyroxine 100 mcg daily and cytomel 3- 5 mcg tabs nightly.

## 2021-06-23 ENCOUNTER — HOSPITAL ENCOUNTER (OUTPATIENT)
Dept: LAB | Facility: MEDICAL CENTER | Age: 56
End: 2021-06-23
Attending: PHYSICIAN ASSISTANT
Payer: COMMERCIAL

## 2021-06-23 DIAGNOSIS — R53.83 FATIGUE, UNSPECIFIED TYPE: ICD-10-CM

## 2021-06-23 DIAGNOSIS — E06.3 HYPOTHYROIDISM DUE TO HASHIMOTO'S THYROIDITIS: ICD-10-CM

## 2021-06-23 DIAGNOSIS — R73.03 PREDIABETES: ICD-10-CM

## 2021-06-23 DIAGNOSIS — E03.8 HYPOTHYROIDISM DUE TO HASHIMOTO'S THYROIDITIS: ICD-10-CM

## 2021-06-23 DIAGNOSIS — Z13.0 SCREENING FOR DEFICIENCY ANEMIA: ICD-10-CM

## 2021-06-23 DIAGNOSIS — Z13.6 SCREENING FOR CARDIOVASCULAR CONDITION: ICD-10-CM

## 2021-06-23 DIAGNOSIS — E55.9 VITAMIN D DEFICIENCY: ICD-10-CM

## 2021-06-23 DIAGNOSIS — E78.2 MIXED HYPERLIPIDEMIA: ICD-10-CM

## 2021-06-23 LAB
25(OH)D3 SERPL-MCNC: 72 NG/ML (ref 30–100)
ALBUMIN SERPL BCP-MCNC: 4.4 G/DL (ref 3.2–4.9)
ALBUMIN/GLOB SERPL: 1.6 G/DL
ALP SERPL-CCNC: 103 U/L (ref 30–99)
ALT SERPL-CCNC: 140 U/L (ref 2–50)
ANION GAP SERPL CALC-SCNC: 13 MMOL/L (ref 7–16)
AST SERPL-CCNC: 66 U/L (ref 12–45)
BILIRUB SERPL-MCNC: 0.5 MG/DL (ref 0.1–1.5)
BUN SERPL-MCNC: 20 MG/DL (ref 8–22)
CALCIUM SERPL-MCNC: 10.3 MG/DL (ref 8.5–10.5)
CHLORIDE SERPL-SCNC: 102 MMOL/L (ref 96–112)
CHOLEST SERPL-MCNC: 206 MG/DL (ref 100–199)
CO2 SERPL-SCNC: 25 MMOL/L (ref 20–33)
CREAT SERPL-MCNC: 0.73 MG/DL (ref 0.5–1.4)
ERYTHROCYTE [DISTWIDTH] IN BLOOD BY AUTOMATED COUNT: 43.1 FL (ref 35.9–50)
EST. AVERAGE GLUCOSE BLD GHB EST-MCNC: 114 MG/DL
FASTING STATUS PATIENT QL REPORTED: NORMAL
GLOBULIN SER CALC-MCNC: 2.8 G/DL (ref 1.9–3.5)
GLUCOSE SERPL-MCNC: 106 MG/DL (ref 65–99)
HBA1C MFR BLD: 5.6 % (ref 4–5.6)
HCT VFR BLD AUTO: 43.3 % (ref 37–47)
HDLC SERPL-MCNC: 62 MG/DL
HGB BLD-MCNC: 14.6 G/DL (ref 12–16)
LDLC SERPL CALC-MCNC: 122 MG/DL
MCH RBC QN AUTO: 31.5 PG (ref 27–33)
MCHC RBC AUTO-ENTMCNC: 33.7 G/DL (ref 33.6–35)
MCV RBC AUTO: 93.5 FL (ref 81.4–97.8)
PLATELET # BLD AUTO: 313 K/UL (ref 164–446)
PMV BLD AUTO: 11.5 FL (ref 9–12.9)
POTASSIUM SERPL-SCNC: 4.2 MMOL/L (ref 3.6–5.5)
PROT SERPL-MCNC: 7.2 G/DL (ref 6–8.2)
RBC # BLD AUTO: 4.63 M/UL (ref 4.2–5.4)
SODIUM SERPL-SCNC: 140 MMOL/L (ref 135–145)
T3FREE SERPL-MCNC: 5.35 PG/ML (ref 2–4.4)
T4 FREE SERPL-MCNC: 1.79 NG/DL (ref 0.93–1.7)
TRIGL SERPL-MCNC: 109 MG/DL (ref 0–149)
TSH SERPL DL<=0.005 MIU/L-ACNC: <0.005 UIU/ML (ref 0.38–5.33)
VIT B12 SERPL-MCNC: 1849 PG/ML (ref 211–911)
WBC # BLD AUTO: 8 K/UL (ref 4.8–10.8)

## 2021-06-23 PROCEDURE — 80053 COMPREHEN METABOLIC PANEL: CPT

## 2021-06-23 PROCEDURE — 80061 LIPID PANEL: CPT

## 2021-06-23 PROCEDURE — 36415 COLL VENOUS BLD VENIPUNCTURE: CPT

## 2021-06-23 PROCEDURE — 82607 VITAMIN B-12: CPT

## 2021-06-23 PROCEDURE — 84482 T3 REVERSE: CPT

## 2021-06-23 PROCEDURE — 86376 MICROSOMAL ANTIBODY EACH: CPT

## 2021-06-23 PROCEDURE — 82306 VITAMIN D 25 HYDROXY: CPT

## 2021-06-23 PROCEDURE — 84443 ASSAY THYROID STIM HORMONE: CPT

## 2021-06-23 PROCEDURE — 83036 HEMOGLOBIN GLYCOSYLATED A1C: CPT

## 2021-06-23 PROCEDURE — 84439 ASSAY OF FREE THYROXINE: CPT

## 2021-06-23 PROCEDURE — 84481 FREE ASSAY (FT-3): CPT

## 2021-06-23 PROCEDURE — 85027 COMPLETE CBC AUTOMATED: CPT

## 2021-06-25 ENCOUNTER — TELEPHONE (OUTPATIENT)
Dept: MEDICAL GROUP | Facility: PHYSICIAN GROUP | Age: 56
End: 2021-06-25

## 2021-06-25 ENCOUNTER — PATIENT MESSAGE (OUTPATIENT)
Dept: MEDICAL GROUP | Facility: PHYSICIAN GROUP | Age: 56
End: 2021-06-25

## 2021-06-25 DIAGNOSIS — E03.8 HYPOTHYROIDISM DUE TO HASHIMOTO'S THYROIDITIS: ICD-10-CM

## 2021-06-25 DIAGNOSIS — E06.3 HYPOTHYROIDISM DUE TO HASHIMOTO'S THYROIDITIS: ICD-10-CM

## 2021-06-25 LAB — THYROPEROXIDASE AB SERPL-ACNC: 32.1 IU/ML (ref 0–9)

## 2021-06-25 RX ORDER — LIOTHYRONINE SODIUM 5 UG/1
TABLET ORAL
Qty: 60 TABLET | Refills: 2 | Status: SHIPPED | OUTPATIENT
Start: 2021-06-25 | End: 2021-06-25 | Stop reason: SDUPTHER

## 2021-06-25 RX ORDER — LIOTHYRONINE SODIUM 5 UG/1
TABLET ORAL
Qty: 60 TABLET | Refills: 2 | Status: SHIPPED | OUTPATIENT
Start: 2021-06-25 | End: 2021-08-30 | Stop reason: SDUPTHER

## 2021-06-25 RX ORDER — LEVOTHYROXINE SODIUM 88 UG/1
88 TABLET ORAL
Qty: 30 TABLET | Refills: 2 | Status: SHIPPED | OUTPATIENT
Start: 2021-06-25 | End: 2021-06-25 | Stop reason: SDUPTHER

## 2021-06-25 RX ORDER — LEVOTHYROXINE SODIUM 88 UG/1
88 TABLET ORAL
Qty: 30 TABLET | Refills: 2 | Status: SHIPPED | OUTPATIENT
Start: 2021-06-25 | End: 2021-07-16 | Stop reason: SDUPTHER

## 2021-06-25 NOTE — PROGRESS NOTES
TSH level too low, reducing medication from 100 mcg to 88 mcg, and the Cytomel from 15 mcg at night to 10 mcg at night.

## 2021-06-28 LAB — T3REVERSE SERPL-MCNC: 15 NG/DL (ref 9–27)

## 2021-07-16 ENCOUNTER — OFFICE VISIT (OUTPATIENT)
Dept: MEDICAL GROUP | Facility: PHYSICIAN GROUP | Age: 56
End: 2021-07-16
Payer: COMMERCIAL

## 2021-07-16 VITALS
DIASTOLIC BLOOD PRESSURE: 76 MMHG | BODY MASS INDEX: 32.1 KG/M2 | HEIGHT: 61 IN | SYSTOLIC BLOOD PRESSURE: 120 MMHG | WEIGHT: 170 LBS | HEART RATE: 58 BPM | OXYGEN SATURATION: 95 % | TEMPERATURE: 97 F | RESPIRATION RATE: 12 BRPM

## 2021-07-16 DIAGNOSIS — R73.03 PREDIABETES: ICD-10-CM

## 2021-07-16 DIAGNOSIS — Z79.899 HIGH RISK MEDICATION USE: ICD-10-CM

## 2021-07-16 DIAGNOSIS — E06.3 HYPOTHYROIDISM DUE TO HASHIMOTO'S THYROIDITIS: ICD-10-CM

## 2021-07-16 DIAGNOSIS — R79.89 ELEVATED LFTS: ICD-10-CM

## 2021-07-16 DIAGNOSIS — E78.5 DYSLIPIDEMIA: ICD-10-CM

## 2021-07-16 DIAGNOSIS — Z23 NEED FOR VACCINATION: ICD-10-CM

## 2021-07-16 DIAGNOSIS — E03.8 HYPOTHYROIDISM DUE TO HASHIMOTO'S THYROIDITIS: ICD-10-CM

## 2021-07-16 DIAGNOSIS — Z78.0 POSTMENOPAUSAL: ICD-10-CM

## 2021-07-16 PROCEDURE — 99214 OFFICE O/P EST MOD 30 MIN: CPT | Performed by: PHYSICIAN ASSISTANT

## 2021-07-16 RX ORDER — LEVOTHYROXINE SODIUM 88 UG/1
88 TABLET ORAL
Qty: 90 TABLET | Refills: 0 | Status: SHIPPED | OUTPATIENT
Start: 2021-07-16 | End: 2021-10-28 | Stop reason: DRUGHIGH

## 2021-07-16 ASSESSMENT — FIBROSIS 4 INDEX: FIB4 SCORE: 1.02

## 2021-07-16 NOTE — TELEPHONE ENCOUNTER
Received request via: Pharmacy    Was the patient seen in the last year in this department? Yes    Does the patient have an active prescription (recently filled or refills available) for medication(s) requested? Yes. Mail in pharmacy asking for 90 day supply

## 2021-07-16 NOTE — PROGRESS NOTES
"CC:   Chief Complaint   Patient presents with   • Lab Results   • Hyperlipidemia          HISTORY OF PRESENT ILLNESS: Patient is a 56 y.o. female established patient who presents today to follow up on the following conditions:       Health Maintenance: Completed  Shingles vaccine recommended.       Hypothyroidism due to Hashimoto's thyroiditis  TSH from June 23, 2021 2 low at less than 0.005. At that time had patient reduce her medication dosage to Cytomel 10 mcg nightly and levothyroxine 88 mcg daily.    Because of the overly suppressed thyroid, would recommend screening DEXA scan at this time.    Dyslipidemia  This is a chronic condition.   Latest Labs:   Lab Results   Component Value Date/Time    CHOLSTRLTOT 206 (H) 06/23/2021 07:28 AM     (H) 06/23/2021 07:28 AM    HDL 62 06/23/2021 07:28 AM    TRIGLYCERIDE 109 06/23/2021 07:28 AM      Medications: none  Risk calculator: The 10-year ASCVD risk score (Cathy ORDONEZ Jr., et al., 2013) is: 2%       Elevated LFTs  Lab Results   Component Value Date/Time    SODIUM 140 06/23/2021 07:28 AM    POTASSIUM 4.2 06/23/2021 07:28 AM    CHLORIDE 102 06/23/2021 07:28 AM    CO2 25 06/23/2021 07:28 AM    ANION 13.0 06/23/2021 07:28 AM    GLUCOSE 106 (H) 06/23/2021 07:28 AM    BUN 20 06/23/2021 07:28 AM    CREATININE 0.73 06/23/2021 07:28 AM    CALCIUM 10.3 06/23/2021 07:28 AM    ASTSGOT 66 (H) 06/23/2021 07:28 AM    ALTSGPT 140 (H) 06/23/2021 07:28 AM    TBILIRUBIN 0.5 06/23/2021 07:28 AM    ALBUMIN 4.4 06/23/2021 07:28 AM    TOTPROTEIN 7.2 06/23/2021 07:28 AM    GLOBULIN 2.8 06/23/2021 07:28 AM    AGRATIO 1.6 06/23/2021 07:28 AM   ]  No recent illness. Could be correlated to thyroid. She did feel that she recently had \"hashimotos flare up\". Felt very fatigued. No fever or chills. No excess tylenol, rarely drink.     Prediabetes  - discussed lifestyle modifications.       Patient Active Problem List    Diagnosis Date Noted   • Bilateral thumb pain 09/04/2020   • Elevated " LFTs 09/04/2020   • Prediabetes 06/04/2020   • Mixed hyperlipidemia 06/04/2020   • Fibroid, uterine 02/02/2018   • Obesity (BMI 30-39.9) 02/02/2018   • Hypothyroidism due to Hashimoto's thyroiditis 02/02/2018   • Pure hypercholesterolemia 02/02/2018   • Snoring 02/02/2018   • Perimenopausal 01/06/2017   • Dyslipidemia 01/06/2017   • Mild intermittent asthma without complication 01/05/2017      Allergies:Patient has no known allergies.    Current Outpatient Medications   Medication Sig Dispense Refill   • levothyroxine (SYNTHROID) 88 MCG Tab Take 1 tablet by mouth every morning on an empty stomach. 30 tablet 2   • liothyronine (CYTOMEL) 5 MCG Tab Take 2 tabs p.o. nightly. 60 tablet 2   • cyanocobalamin (VITAMIN B-12) 1000 MCG/ML Solution      • meloxicam (MOBIC) 15 MG tablet meloxicam 15 mg tablet     • montelukast (SINGULAIR) 10 MG Tab Take 1 tablet by mouth every evening. 90 tablet 1   • vitamin D (CHOLECALCIFEROL) 1000 UNIT Tab Take 3,000 Units by mouth every evening.     • Probiotic Product (PROBIOTIC DAILY PO) Take 2 Tabs by mouth every evening.     • Multiple Vitamins-Minerals (ONE-A-DAY 50 PLUS PO) Take 1 Tab by mouth every evening.     • Calcium Carb-Cholecalciferol (CALCIUM 600 + D PO) Take 1 Tab by mouth every evening.       No current facility-administered medications for this visit.       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        Family History   Problem Relation Age of Onset   • Arthritis Mother    • Hypertension Mother    • Hyperlipidemia Mother    • Arthritis Father    • Hypertension Father    • Hyperlipidemia Father    • Cancer Paternal Aunt         breast cancer       Review of Systems:    Constitutional: No Fevers, Chills  Eyes: No vision changes  ENT:  "No hearing changes  Resp: No Shortness of breath  CV: No Chest pain  GI: No Nausea/Vomiting  MSK: No weakness  Skin: No rashes  Neuro: No Headaches  Psych: No Suicidal ideations    All remaining systems reviewed and found to be negative, except as stated above.    Exam:    /76   Pulse (!) 58   Temp 36.1 °C (97 °F) (Temporal)   Resp 12   Ht 1.549 m (5' 1\")   Wt 77.1 kg (170 lb)   SpO2 95%  Body mass index is 32.12 kg/m².    General:  Well nourished, well developed female in NAD  HENT: Atraumatic, normocephalic  EYES: Extraocular movements intact  NECK: Supple, FROM  CHEST: No deformities, Equal chest expansion  RESP: Unlabored, no stridor or audible wheeze  HEART: Regular Rate and rhythm.   Extremities: No Clubbing, Cyanosis, or Edema  Skin: Warm/dry, without rashes  Neuro: A/O x 4, due to COVID-19- did not have patient remove face mask to test cranial nerves.  Motor/sensory grossly intact  Psych: Normal behavior, normal affect      Lab review:  Labs are reviewed and discussed with a patient  Lab Results   Component Value Date/Time    CHOLSTRLTOT 206 (H) 06/23/2021 07:28 AM     (H) 06/23/2021 07:28 AM    HDL 62 06/23/2021 07:28 AM    TRIGLYCERIDE 109 06/23/2021 07:28 AM       Lab Results   Component Value Date/Time    SODIUM 140 06/23/2021 07:28 AM    POTASSIUM 4.2 06/23/2021 07:28 AM    CHLORIDE 102 06/23/2021 07:28 AM    CO2 25 06/23/2021 07:28 AM    GLUCOSE 106 (H) 06/23/2021 07:28 AM    BUN 20 06/23/2021 07:28 AM    CREATININE 0.73 06/23/2021 07:28 AM     Lab Results   Component Value Date/Time    ALKPHOSPHAT 103 (H) 06/23/2021 07:28 AM    ASTSGOT 66 (H) 06/23/2021 07:28 AM    ALTSGPT 140 (H) 06/23/2021 07:28 AM    TBILIRUBIN 0.5 06/23/2021 07:28 AM      Lab Results   Component Value Date/Time    HBA1C 5.6 06/23/2021 07:28 AM    HBA1C 5.7 (H) 06/05/2020 10:01 AM    HBA1C 5.7 (H) 12/06/2019 08:16 AM     No results found for: TSH  Lab Results   Component Value Date/Time    FREET4 1.79 (H) " 06/23/2021 07:28 AM    FREET4 1.36 07/28/2020 08:03 AM       Lab Results   Component Value Date/Time    WBC 8.0 06/23/2021 07:28 AM    RBC 4.63 06/23/2021 07:28 AM    HEMOGLOBIN 14.6 06/23/2021 07:28 AM    HEMATOCRIT 43.3 06/23/2021 07:28 AM    MCV 93.5 06/23/2021 07:28 AM    MCH 31.5 06/23/2021 07:28 AM    MCHC 33.7 06/23/2021 07:28 AM    MPV 11.5 06/23/2021 07:28 AM    NEUTSPOLYS 47.40 04/18/2019 06:36 AM    LYMPHOCYTES 40.50 04/18/2019 06:36 AM    MONOCYTES 8.00 04/18/2019 06:36 AM    EOSINOPHILS 3.10 04/18/2019 06:36 AM    BASOPHILS 0.80 04/18/2019 06:36 AM          Assessment/Plan:  1. Hypothyroidism due to Hashimoto's thyroiditis  - DS-BONE DENSITY STUDY (DEXA); Future  TSH has been suppressed for quite some time.  I have taken over her medications for her thyroid.  Patient feeling well on levothyroxine 88 mcg and Cytomel 10 mcg at night.  Repeating labs in the next few weeks to look for correction of her TSH.  May need to continue to taper down on these medications.  Patient states she is actually feeling somewhat better on the reduced dose.  Patient states that she was put on calcitonin because she is on Cytomel, however her calcium level is at the higher end of normal and she is supplementing over-the-counter.  I would like her to supplement over-the-counter 1200 mg of calcium with 2000 IUs of vitamin D a day.  We will follow calcium levels closely.  2. Dyslipidemia  Mildly elevated LDL, discussed lifestyle modifications today.  Repeat labs in 3 months.  3. Postmenopausal  - DS-BONE DENSITY STUDY (DEXA); Future    4. Elevated LFTs  - HEPATIC FUNCTION PANEL; Future  Acutely elevated LFTs on her most recent labs.  No recent illness.  Certainly could be correlated to her thyroid.  Repeating with her next set of labs in the next 2 to 4 weeks.  No excess alcohol or Tylenol use.  5. Prediabetes  Fasting blood sugar slightly elevated will get A1c in 3 months.  Discussed lifestyle modifications.       Follow-up:  Return in about 4 weeks (around 8/13/2021) for Follow up on labs.    Please note that this dictation was created using voice recognition software. I have made every reasonable attempt to correct obvious errors, but I expect that there are errors of grammar and possibly content that I did not discover before finalizing the note.

## 2021-07-16 NOTE — ASSESSMENT & PLAN NOTE
This is a chronic condition.   Latest Labs:   Lab Results   Component Value Date/Time    CHOLSTRLTOT 206 (H) 06/23/2021 07:28 AM     (H) 06/23/2021 07:28 AM    HDL 62 06/23/2021 07:28 AM    TRIGLYCERIDE 109 06/23/2021 07:28 AM      Medications: none  Risk calculator: The 10-year ASCVD risk score (Cathy ORDONEZ Jr., et al., 2013) is: 2%

## 2021-07-16 NOTE — ASSESSMENT & PLAN NOTE
"Lab Results   Component Value Date/Time    SODIUM 140 06/23/2021 07:28 AM    POTASSIUM 4.2 06/23/2021 07:28 AM    CHLORIDE 102 06/23/2021 07:28 AM    CO2 25 06/23/2021 07:28 AM    ANION 13.0 06/23/2021 07:28 AM    GLUCOSE 106 (H) 06/23/2021 07:28 AM    BUN 20 06/23/2021 07:28 AM    CREATININE 0.73 06/23/2021 07:28 AM    CALCIUM 10.3 06/23/2021 07:28 AM    ASTSGOT 66 (H) 06/23/2021 07:28 AM    ALTSGPT 140 (H) 06/23/2021 07:28 AM    TBILIRUBIN 0.5 06/23/2021 07:28 AM    ALBUMIN 4.4 06/23/2021 07:28 AM    TOTPROTEIN 7.2 06/23/2021 07:28 AM    GLOBULIN 2.8 06/23/2021 07:28 AM    AGRATIO 1.6 06/23/2021 07:28 AM   ]  No recent illness. Could be correlated to thyroid. She did feel that she recently had \"hashimotos flare up\". Felt very fatigued. No fever or chills. No excess tylenol, rarely drink.   "

## 2021-07-16 NOTE — ASSESSMENT & PLAN NOTE
TSH from June 23, 2021 2 low at less than 0.005. At that time had patient reduce her medication dosage to Cytomel 10 mcg nightly and levothyroxine 88 mcg daily.    Because of the overly suppressed thyroid, would recommend screening DEXA scan at this time.

## 2021-08-11 ENCOUNTER — HOSPITAL ENCOUNTER (OUTPATIENT)
Dept: LAB | Facility: MEDICAL CENTER | Age: 56
End: 2021-08-11
Attending: PHYSICIAN ASSISTANT
Payer: COMMERCIAL

## 2021-08-11 DIAGNOSIS — R79.89 ELEVATED LFTS: ICD-10-CM

## 2021-08-11 DIAGNOSIS — Z79.899 HIGH RISK MEDICATION USE: ICD-10-CM

## 2021-08-11 DIAGNOSIS — E06.3 HYPOTHYROIDISM DUE TO HASHIMOTO'S THYROIDITIS: ICD-10-CM

## 2021-08-11 DIAGNOSIS — E03.8 HYPOTHYROIDISM DUE TO HASHIMOTO'S THYROIDITIS: ICD-10-CM

## 2021-08-11 LAB
ALBUMIN SERPL BCP-MCNC: 4.1 G/DL (ref 3.2–4.9)
ALP SERPL-CCNC: 85 U/L (ref 30–99)
ALT SERPL-CCNC: 57 U/L (ref 2–50)
ANION GAP SERPL CALC-SCNC: 12 MMOL/L (ref 7–16)
AST SERPL-CCNC: 40 U/L (ref 12–45)
BILIRUB CONJ SERPL-MCNC: <0.2 MG/DL (ref 0.1–0.5)
BILIRUB INDIRECT SERPL-MCNC: ABNORMAL MG/DL (ref 0–1)
BILIRUB SERPL-MCNC: 0.5 MG/DL (ref 0.1–1.5)
BUN SERPL-MCNC: 13 MG/DL (ref 8–22)
CALCIUM SERPL-MCNC: 9.8 MG/DL (ref 8.5–10.5)
CHLORIDE SERPL-SCNC: 106 MMOL/L (ref 96–112)
CO2 SERPL-SCNC: 24 MMOL/L (ref 20–33)
CREAT SERPL-MCNC: 0.86 MG/DL (ref 0.5–1.4)
GLUCOSE SERPL-MCNC: 99 MG/DL (ref 65–99)
POTASSIUM SERPL-SCNC: 4.6 MMOL/L (ref 3.6–5.5)
PROT SERPL-MCNC: 7 G/DL (ref 6–8.2)
SODIUM SERPL-SCNC: 142 MMOL/L (ref 135–145)

## 2021-08-11 PROCEDURE — 84439 ASSAY OF FREE THYROXINE: CPT

## 2021-08-11 PROCEDURE — 84481 FREE ASSAY (FT-3): CPT

## 2021-08-11 PROCEDURE — 80076 HEPATIC FUNCTION PANEL: CPT

## 2021-08-11 PROCEDURE — 84443 ASSAY THYROID STIM HORMONE: CPT

## 2021-08-11 PROCEDURE — 80048 BASIC METABOLIC PNL TOTAL CA: CPT

## 2021-08-11 PROCEDURE — 36415 COLL VENOUS BLD VENIPUNCTURE: CPT

## 2021-08-12 LAB
T3FREE SERPL-MCNC: 4.2 PG/ML (ref 2–4.4)
T4 FREE SERPL-MCNC: 1.63 NG/DL (ref 0.93–1.7)
TSH SERPL DL<=0.005 MIU/L-ACNC: 0.01 UIU/ML (ref 0.38–5.33)

## 2021-08-27 ENCOUNTER — OFFICE VISIT (OUTPATIENT)
Dept: MEDICAL GROUP | Facility: PHYSICIAN GROUP | Age: 56
End: 2021-08-27
Payer: COMMERCIAL

## 2021-08-27 VITALS
TEMPERATURE: 97.7 F | SYSTOLIC BLOOD PRESSURE: 116 MMHG | HEART RATE: 71 BPM | RESPIRATION RATE: 12 BRPM | HEIGHT: 61 IN | OXYGEN SATURATION: 96 % | BODY MASS INDEX: 32.85 KG/M2 | DIASTOLIC BLOOD PRESSURE: 74 MMHG | WEIGHT: 174 LBS

## 2021-08-27 DIAGNOSIS — E78.5 DYSLIPIDEMIA: ICD-10-CM

## 2021-08-27 DIAGNOSIS — E06.3 HYPOTHYROIDISM DUE TO HASHIMOTO'S THYROIDITIS: ICD-10-CM

## 2021-08-27 DIAGNOSIS — R73.03 PREDIABETES: ICD-10-CM

## 2021-08-27 DIAGNOSIS — Z23 NEED FOR VACCINATION: ICD-10-CM

## 2021-08-27 DIAGNOSIS — E03.8 HYPOTHYROIDISM DUE TO HASHIMOTO'S THYROIDITIS: ICD-10-CM

## 2021-08-27 DIAGNOSIS — R79.89 ELEVATED LFTS: ICD-10-CM

## 2021-08-27 PROCEDURE — 90750 HZV VACC RECOMBINANT IM: CPT | Performed by: PHYSICIAN ASSISTANT

## 2021-08-27 PROCEDURE — 90471 IMMUNIZATION ADMIN: CPT | Performed by: PHYSICIAN ASSISTANT

## 2021-08-27 PROCEDURE — 99214 OFFICE O/P EST MOD 30 MIN: CPT | Mod: 25 | Performed by: PHYSICIAN ASSISTANT

## 2021-08-27 ASSESSMENT — FIBROSIS 4 INDEX: FIB4 SCORE: 0.96

## 2021-08-27 NOTE — ASSESSMENT & PLAN NOTE
BMP within normal limits with a normal GFR.  Patient currently on levothyroxine 88 mcg and Cytomel 10 mcg nightly.Free T3 improved and now within normal limits at 4.20.  Free T4 within normal limits at 1.63.

## 2021-08-27 NOTE — ASSESSMENT & PLAN NOTE
Liver enzymes grossly improved compared to previous on July 23, 2021.  Previous AST, ALT and alkaline phosphatase were at 66, 140 and 103 respectively.  Repeat LFTs show alkaline phosphatase, AST and ALT at 85, 40 and 57.

## 2021-08-27 NOTE — PROGRESS NOTES
CC:   Chief Complaint   Patient presents with   • Lab Results   • Hypothyroidism          HISTORY OF PRESENT ILLNESS: Patient is a 56 y.o. female established patient who presents today to follow up on the following conditions:       Health Maintenance: Completed    Elevated LFTs  Liver enzymes grossly improved compared to previous on July 23, 2021.  Previous AST, ALT and alkaline phosphatase were at 66, 140 and 103 respectively.  Repeat LFTs show alkaline phosphatase, AST and ALT at 85, 40 and 57.    Hypothyroidism due to Hashimoto's thyroiditis      BMP within normal limits with a normal GFR.  Patient currently on levothyroxine 88 mcg and Cytomel 10 mcg nightly.Free T3 improved and now within normal limits at 4.20.  Free T4 within normal limits at 1.63.    Prediabetes  Repeat FBS was normal.       Patient Active Problem List    Diagnosis Date Noted   • Bilateral thumb pain 09/04/2020   • Elevated LFTs 09/04/2020   • Prediabetes 06/04/2020   • Mixed hyperlipidemia 06/04/2020   • Fibroid, uterine 02/02/2018   • Obesity (BMI 30-39.9) 02/02/2018   • Hypothyroidism due to Hashimoto's thyroiditis 02/02/2018   • Pure hypercholesterolemia 02/02/2018   • Snoring 02/02/2018   • Perimenopausal 01/06/2017   • Dyslipidemia 01/06/2017   • Mild intermittent asthma without complication 01/05/2017      Allergies:Patient has no known allergies.    Current Outpatient Medications   Medication Sig Dispense Refill   • levothyroxine (SYNTHROID) 88 MCG Tab Take 1 tablet by mouth every morning on an empty stomach. 90 tablet 0   • liothyronine (CYTOMEL) 5 MCG Tab Take 2 tabs p.o. nightly. 60 tablet 2   • cyanocobalamin (VITAMIN B-12) 1000 MCG/ML Solution      • meloxicam (MOBIC) 15 MG tablet meloxicam 15 mg tablet     • montelukast (SINGULAIR) 10 MG Tab Take 1 tablet by mouth every evening. 90 tablet 1   • vitamin D (CHOLECALCIFEROL) 1000 UNIT Tab Take 3,000 Units by mouth every evening.     • Probiotic Product (PROBIOTIC DAILY PO) Take 2  "Tabs by mouth every evening.     • Multiple Vitamins-Minerals (ONE-A-DAY 50 PLUS PO) Take 1 Tab by mouth every evening.     • Calcium Carb-Cholecalciferol (CALCIUM 600 + D PO) Take 1 Tab by mouth every evening.       No current facility-administered medications for this visit.       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        Family History   Problem Relation Age of Onset   • Arthritis Mother    • Hypertension Mother    • Hyperlipidemia Mother    • Arthritis Father    • Hypertension Father    • Hyperlipidemia Father    • Cancer Paternal Aunt         breast cancer       Review of Systems:    Constitutional: No Fevers, Chills  Eyes: No vision changes  ENT: No hearing changes  Resp: No Shortness of breath  CV: No Chest pain  GI: No Nausea/Vomiting  MSK: No weakness  Skin: No rashes  Neuro: No Headaches  Psych: No Suicidal ideations    All remaining systems reviewed and found to be negative, except as stated above.    Exam:    /74   Pulse 71   Temp 36.5 °C (97.7 °F) (Temporal)   Resp 12   Ht 1.549 m (5' 1\")   Wt 78.9 kg (174 lb)   SpO2 96%  Body mass index is 32.88 kg/m².    General:  Well nourished, well developed female in NAD  HENT: Atraumatic, normocephalic  EYES: Extraocular movements intact  NECK: Supple, FROM  CHEST: No deformities, Equal chest expansion  RESP: Unlabored, no stridor or audible wheeze  HEART: Regular Rate and rhythm.   Extremities: No Clubbing, Cyanosis, or Edema  Skin: Warm/dry, without rashes  Neuro: A/O x 4, due to COVID-19- did not have patient remove face mask to test cranial nerves.  Motor/sensory grossly intact  Psych: Normal behavior, normal affect      Lab review:  Labs are reviewed and discussed with a patient  Lab Results "   Component Value Date/Time    CHOLSTRLTOT 206 (H) 06/23/2021 07:28 AM     (H) 06/23/2021 07:28 AM    HDL 62 06/23/2021 07:28 AM    TRIGLYCERIDE 109 06/23/2021 07:28 AM       Lab Results   Component Value Date/Time    SODIUM 142 08/11/2021 08:00 AM    POTASSIUM 4.6 08/11/2021 08:00 AM    CHLORIDE 106 08/11/2021 08:00 AM    CO2 24 08/11/2021 08:00 AM    GLUCOSE 99 08/11/2021 08:00 AM    BUN 13 08/11/2021 08:00 AM    CREATININE 0.86 08/11/2021 08:00 AM     Lab Results   Component Value Date/Time    ALKPHOSPHAT 85 08/11/2021 08:00 AM    ASTSGOT 40 08/11/2021 08:00 AM    ALTSGPT 57 (H) 08/11/2021 08:00 AM    TBILIRUBIN 0.5 08/11/2021 08:00 AM      Lab Results   Component Value Date/Time    HBA1C 5.6 06/23/2021 07:28 AM    HBA1C 5.7 (H) 06/05/2020 10:01 AM    HBA1C 5.7 (H) 12/06/2019 08:16 AM     No results found for: TSH  Lab Results   Component Value Date/Time    FREET4 1.63 08/11/2021 01:28 PM    FREET4 1.79 (H) 06/23/2021 07:28 AM       Lab Results   Component Value Date/Time    WBC 8.0 06/23/2021 07:28 AM    RBC 4.63 06/23/2021 07:28 AM    HEMOGLOBIN 14.6 06/23/2021 07:28 AM    HEMATOCRIT 43.3 06/23/2021 07:28 AM    MCV 93.5 06/23/2021 07:28 AM    MCH 31.5 06/23/2021 07:28 AM    MCHC 33.7 06/23/2021 07:28 AM    MPV 11.5 06/23/2021 07:28 AM    NEUTSPOLYS 47.40 04/18/2019 06:36 AM    LYMPHOCYTES 40.50 04/18/2019 06:36 AM    MONOCYTES 8.00 04/18/2019 06:36 AM    EOSINOPHILS 3.10 04/18/2019 06:36 AM    BASOPHILS 0.80 04/18/2019 06:36 AM          Assessment/Plan:  1. Elevated LFTs  - HEPATIC FUNCTION PANEL; Future  LFTs dramatically improved, suspect correlation to thyroid.  We will repeat with next the labs in 6 to 8 weeks.      2. Hypothyroidism due to Hashimoto's thyroiditis  - TSH; Future  - T3 FREE; Future  - FREE THYROXINE; Future  TSH improved but not within normal limits.  We will trial reducing levothyroxine to half tab 88 mcg twice a week and full tab 5 days a week.  Continue Cytomel 10 mcg nightly.   Repeat labs in 6 to 8 weeks.  3. Need for vaccination  - Shingrix Vaccine    4. Prediabetes  Repeat fasting blood sugar within normal limits.  5. Dyslipidemia  - Lipid Profile; Future  Due for repeat lipid panel before next the labs.    Follow-up: Return in about 6 weeks (around 10/8/2021) for Follow up on labs.    Please note that this dictation was created using voice recognition software. I have made every reasonable attempt to correct obvious errors, but I expect that there are errors of grammar and possibly content that I did not discover before finalizing the note.

## 2021-08-30 RX ORDER — LIOTHYRONINE SODIUM 5 UG/1
TABLET ORAL
Qty: 180 TABLET | Refills: 1 | Status: SHIPPED | OUTPATIENT
Start: 2021-08-30

## 2021-08-30 NOTE — TELEPHONE ENCOUNTER
Received request via: Pharmacy    Was the patient seen in the last year in this department? Yes    Does the patient have an active prescription (recently filled or refills available) for medication(s) requested? Yes. Mail in pharmacy requesting 90 day supply

## 2021-09-09 RX ORDER — MONTELUKAST SODIUM 10 MG/1
TABLET ORAL
Qty: 90 TABLET | Refills: 3 | Status: SHIPPED | OUTPATIENT
Start: 2021-09-09

## 2021-09-14 ENCOUNTER — APPOINTMENT (OUTPATIENT)
Dept: RADIOLOGY | Facility: MEDICAL CENTER | Age: 56
End: 2021-09-14
Attending: PHYSICIAN ASSISTANT
Payer: COMMERCIAL

## 2021-10-22 ENCOUNTER — HOSPITAL ENCOUNTER (OUTPATIENT)
Dept: RADIOLOGY | Facility: MEDICAL CENTER | Age: 56
End: 2021-10-22
Attending: PHYSICIAN ASSISTANT
Payer: COMMERCIAL

## 2021-10-22 DIAGNOSIS — E06.3 HYPOTHYROIDISM DUE TO HASHIMOTO'S THYROIDITIS: ICD-10-CM

## 2021-10-22 DIAGNOSIS — Z78.0 POSTMENOPAUSAL: ICD-10-CM

## 2021-10-22 DIAGNOSIS — E03.8 HYPOTHYROIDISM DUE TO HASHIMOTO'S THYROIDITIS: ICD-10-CM

## 2021-10-22 PROCEDURE — 77080 DXA BONE DENSITY AXIAL: CPT

## 2021-10-25 ENCOUNTER — TELEPHONE (OUTPATIENT)
Dept: MEDICAL GROUP | Facility: PHYSICIAN GROUP | Age: 56
End: 2021-10-25

## 2021-10-25 NOTE — TELEPHONE ENCOUNTER
----- Message from Nan Garcia P.A.-C. sent at 10/22/2021 12:15 PM PDT -----  Please call patient about their results.     Results showed: Normal bone density scan.    Thank you,    Nan Garcia PA-C

## 2021-10-28 ENCOUNTER — HOSPITAL ENCOUNTER (OUTPATIENT)
Dept: LAB | Facility: MEDICAL CENTER | Age: 56
End: 2021-10-28
Attending: PHYSICIAN ASSISTANT
Payer: COMMERCIAL

## 2021-10-28 DIAGNOSIS — R79.89 ELEVATED LFTS: ICD-10-CM

## 2021-10-28 DIAGNOSIS — E06.3 HYPOTHYROIDISM DUE TO HASHIMOTO'S THYROIDITIS: ICD-10-CM

## 2021-10-28 DIAGNOSIS — E03.8 HYPOTHYROIDISM DUE TO HASHIMOTO'S THYROIDITIS: ICD-10-CM

## 2021-10-28 DIAGNOSIS — E78.5 DYSLIPIDEMIA: ICD-10-CM

## 2021-10-28 LAB
ALBUMIN SERPL BCP-MCNC: 4.5 G/DL (ref 3.2–4.9)
ALP SERPL-CCNC: 81 U/L (ref 30–99)
ALT SERPL-CCNC: 23 U/L (ref 2–50)
AST SERPL-CCNC: 17 U/L (ref 12–45)
BILIRUB CONJ SERPL-MCNC: <0.2 MG/DL (ref 0.1–0.5)
BILIRUB INDIRECT SERPL-MCNC: NORMAL MG/DL (ref 0–1)
BILIRUB SERPL-MCNC: 0.5 MG/DL (ref 0.1–1.5)
CHOLEST SERPL-MCNC: 198 MG/DL (ref 100–199)
FASTING STATUS PATIENT QL REPORTED: NORMAL
HDLC SERPL-MCNC: 56 MG/DL
LDLC SERPL CALC-MCNC: 115 MG/DL
PROT SERPL-MCNC: 7.2 G/DL (ref 6–8.2)
T3FREE SERPL-MCNC: 4.05 PG/ML (ref 2–4.4)
T4 FREE SERPL-MCNC: 1.33 NG/DL (ref 0.93–1.7)
TRIGL SERPL-MCNC: 136 MG/DL (ref 0–149)
TSH SERPL DL<=0.005 MIU/L-ACNC: 0.02 UIU/ML (ref 0.38–5.33)

## 2021-10-28 PROCEDURE — 80076 HEPATIC FUNCTION PANEL: CPT

## 2021-10-28 PROCEDURE — 84443 ASSAY THYROID STIM HORMONE: CPT

## 2021-10-28 PROCEDURE — 84439 ASSAY OF FREE THYROXINE: CPT

## 2021-10-28 PROCEDURE — 36415 COLL VENOUS BLD VENIPUNCTURE: CPT

## 2021-10-28 PROCEDURE — 84481 FREE ASSAY (FT-3): CPT

## 2021-10-28 PROCEDURE — 80061 LIPID PANEL: CPT

## 2021-10-28 RX ORDER — LEVOTHYROXINE SODIUM 0.07 MG/1
TABLET ORAL
Qty: 30 TABLET | Refills: 2 | Status: SHIPPED | OUTPATIENT
Start: 2021-10-28

## 2021-11-10 ENCOUNTER — OFFICE VISIT (OUTPATIENT)
Dept: MEDICAL GROUP | Facility: PHYSICIAN GROUP | Age: 56
End: 2021-11-10
Payer: COMMERCIAL

## 2021-11-10 VITALS
RESPIRATION RATE: 16 BRPM | HEIGHT: 61 IN | BODY MASS INDEX: 33.23 KG/M2 | WEIGHT: 176 LBS | TEMPERATURE: 98.7 F | HEART RATE: 80 BPM | DIASTOLIC BLOOD PRESSURE: 70 MMHG | SYSTOLIC BLOOD PRESSURE: 126 MMHG | OXYGEN SATURATION: 96 %

## 2021-11-10 DIAGNOSIS — E06.3 HYPOTHYROIDISM DUE TO HASHIMOTO'S THYROIDITIS: ICD-10-CM

## 2021-11-10 DIAGNOSIS — E78.5 DYSLIPIDEMIA: ICD-10-CM

## 2021-11-10 DIAGNOSIS — E03.8 HYPOTHYROIDISM DUE TO HASHIMOTO'S THYROIDITIS: ICD-10-CM

## 2021-11-10 DIAGNOSIS — R79.89 ELEVATED LFTS: ICD-10-CM

## 2021-11-10 PROCEDURE — 99214 OFFICE O/P EST MOD 30 MIN: CPT | Performed by: PHYSICIAN ASSISTANT

## 2021-11-10 ASSESSMENT — FIBROSIS 4 INDEX: FIB4 SCORE: 0.65

## 2021-11-10 NOTE — ASSESSMENT & PLAN NOTE
TSH improved but not within normal limits previously was at 0.010 now currently at 0.020.  Discussed with patient the need to reduce her medication further.  Free T3 and free T4 within normal limits.  Per lab results I recommend the patient reduce her levothyroxine to 75 mcg 1 tab 5 days a week and half tab twice a week and continue Cytomel 10 mcg nightly.    Did dexa scan because of suppressed thyroid-this was normal. Recommend repeating age 64.     Since reducing her medication to 75 mcg to daily she is feeling more tired, interruption of sleep.

## 2021-11-10 NOTE — ASSESSMENT & PLAN NOTE
Cholesterol overall improved compared to previous, LDL went from 122 now at 115, total cholesterol went from 206 now at 198.

## 2021-11-10 NOTE — PROGRESS NOTES
CC:   Chief Complaint   Patient presents with   • Lab Results          HISTORY OF PRESENT ILLNESS: Patient is a 56 y.o. female established patient who presents today to follow up on the following conditions:       Health Maintenance: Completed    Dyslipidemia  Cholesterol overall improved compared to previous, LDL went from 122 now at 115, total cholesterol went from 206 now at 198.    Elevated LFTs  LFTs are within normal limits.    Hypothyroidism due to Hashimoto's thyroiditis  TSH improved but not within normal limits previously was at 0.010 now currently at 0.020.  Discussed with patient the need to reduce her medication further.  Free T3 and free T4 within normal limits.  Per lab results I recommend the patient reduce her levothyroxine to 75 mcg 1 tab 5 days a week and half tab twice a week and continue Cytomel 10 mcg nightly.    Did dexa scan because of suppressed thyroid-this was normal. Recommend repeating age 64.     Since reducing her medication to 75 mcg to daily she is feeling more tired, interruption of sleep.       Patient Active Problem List    Diagnosis Date Noted   • Bilateral thumb pain 09/04/2020   • Elevated LFTs 09/04/2020   • Prediabetes 06/04/2020   • Mixed hyperlipidemia 06/04/2020   • Fibroid, uterine 02/02/2018   • Obesity (BMI 30-39.9) 02/02/2018   • Hypothyroidism due to Hashimoto's thyroiditis 02/02/2018   • Pure hypercholesterolemia 02/02/2018   • Snoring 02/02/2018   • Perimenopausal 01/06/2017   • Dyslipidemia 01/06/2017   • Mild intermittent asthma without complication 01/05/2017      Allergies:Patient has no known allergies.    Current Outpatient Medications   Medication Sig Dispense Refill   • levothyroxine (SYNTHROID) 75 MCG Tab Take 1 tab daily Sunday, Tuesday, Wednesday, Friday and Saturday, and 1/2 tab daily Monday and Thursday. 30 Tablet 2   • montelukast (SINGULAIR) 10 MG Tab TAKE 1 TABLET EVERY EVENING 90 Tablet 3   • liothyronine (CYTOMEL) 5 MCG Tab Take 2 tabs p.o.  "nightly. 180 Tablet 1   • meloxicam (MOBIC) 15 MG tablet meloxicam 15 mg tablet     • vitamin D (CHOLECALCIFEROL) 1000 UNIT Tab Take 3,000 Units by mouth every evening.     • Probiotic Product (PROBIOTIC DAILY PO) Take 2 Tabs by mouth every evening.     • Multiple Vitamins-Minerals (ONE-A-DAY 50 PLUS PO) Take 1 Tab by mouth every evening.     • Calcium Carb-Cholecalciferol (CALCIUM 600 + D PO) Take 1 Tab by mouth every evening.     • cyanocobalamin (VITAMIN B-12) 1000 MCG/ML Solution  (Patient not taking: Reported on 11/10/2021)       No current facility-administered medications for this visit.       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        Family History   Problem Relation Age of Onset   • Arthritis Mother    • Hypertension Mother    • Hyperlipidemia Mother    • Arthritis Father    • Hypertension Father    • Hyperlipidemia Father    • Cancer Paternal Aunt         breast cancer       Review of Systems:    Constitutional: No Fevers, Chills  Eyes: No vision changes  ENT: No hearing changes  Resp: No Shortness of breath  CV: No Chest pain  GI: No Nausea/Vomiting  MSK: No weakness  Skin: No rashes  Neuro: No Headaches  Psych: No Suicidal ideations    All remaining systems reviewed and found to be negative, except as stated above.    Exam:    /70 (BP Location: Left arm, Patient Position: Sitting, BP Cuff Size: Adult)   Pulse 80   Temp 37.1 °C (98.7 °F) (Temporal)   Resp 16   Ht 1.549 m (5' 1\")   Wt 79.8 kg (176 lb)   SpO2 96%  Body mass index is 33.25 kg/m².    General:  Well nourished, well developed female in NAD  HENT: Atraumatic, normocephalic  EYES: Extraocular movements intact  NECK: Supple, FROM  CHEST: No deformities, Equal chest expansion  RESP: " Unlabored, no stridor or audible wheeze  HEART: Regular Rate and rhythm.   Extremities: No Clubbing, Cyanosis, or Edema  Skin: Warm/dry, without rashes  Neuro: A/O x 4, due to COVID-19- did not have patient remove face mask to test cranial nerves.  Motor/sensory grossly intact  Psych: Normal behavior, normal affect      Lab review:  Labs are reviewed and discussed with a patient  Lab Results   Component Value Date/Time    CHOLSTRLTOT 198 10/28/2021 07:31 AM     (H) 10/28/2021 07:31 AM    HDL 56 10/28/2021 07:31 AM    TRIGLYCERIDE 136 10/28/2021 07:31 AM       Lab Results   Component Value Date/Time    SODIUM 142 08/11/2021 08:00 AM    POTASSIUM 4.6 08/11/2021 08:00 AM    CHLORIDE 106 08/11/2021 08:00 AM    CO2 24 08/11/2021 08:00 AM    GLUCOSE 99 08/11/2021 08:00 AM    BUN 13 08/11/2021 08:00 AM    CREATININE 0.86 08/11/2021 08:00 AM     Lab Results   Component Value Date/Time    ALKPHOSPHAT 81 10/28/2021 07:31 AM    ASTSGOT 17 10/28/2021 07:31 AM    ALTSGPT 23 10/28/2021 07:31 AM    TBILIRUBIN 0.5 10/28/2021 07:31 AM      Lab Results   Component Value Date/Time    HBA1C 5.6 06/23/2021 07:28 AM    HBA1C 5.7 (H) 06/05/2020 10:01 AM    HBA1C 5.7 (H) 12/06/2019 08:16 AM     No results found for: TSH  Lab Results   Component Value Date/Time    FREET4 1.33 10/28/2021 07:31 AM    FREET4 1.63 08/11/2021 01:28 PM       Lab Results   Component Value Date/Time    WBC 8.0 06/23/2021 07:28 AM    RBC 4.63 06/23/2021 07:28 AM    HEMOGLOBIN 14.6 06/23/2021 07:28 AM    HEMATOCRIT 43.3 06/23/2021 07:28 AM    MCV 93.5 06/23/2021 07:28 AM    MCH 31.5 06/23/2021 07:28 AM    MCHC 33.7 06/23/2021 07:28 AM    MPV 11.5 06/23/2021 07:28 AM    NEUTSPOLYS 47.40 04/18/2019 06:36 AM    LYMPHOCYTES 40.50 04/18/2019 06:36 AM    MONOCYTES 8.00 04/18/2019 06:36 AM    EOSINOPHILS 3.10 04/18/2019 06:36 AM    BASOPHILS 0.80 04/18/2019 06:36 AM          Assessment/Plan:  1. Dyslipidemia  LDL improved, continue lifestyle modifications.  2.  Elevated LFTs  Resolved  3. Hypothyroidism due to Hashimoto's thyroiditis  - TSH; Future  - T3 FREE; Future  - FREE THYROXINE; Future  TSH improved but not at goal.  Patient states that she is feeling more fatigued and having insomnia issues since we reduce the dosage.  Hoping that she will acclimate to the reduction of the medication, but will watch closely.  At this time I would like her to continue the 75 mcg daily with the Cytomel 10 mcg daily.  Repeat TSH in the next 3 to 4 weeks.  If within normal limits and she still symptomatic we will see if we can increase the dose to alternating 75 and 88 mcg.  Would also be consideration to maybe reduce the Cytomel, but I feel that she would become more symptomatic if we do this.  DEXA scan is within normal limits.  Recommend repeating when she turns 64.    Follow-up: Return in about 4 weeks (around 12/8/2021).    Please note that this dictation was created using voice recognition software. I have made every reasonable attempt to correct obvious errors, but I expect that there are errors of grammar and possibly content that I did not discover before finalizing the note.

## 2021-11-18 ENCOUNTER — APPOINTMENT (OUTPATIENT)
Dept: MEDICAL GROUP | Facility: PHYSICIAN GROUP | Age: 56
End: 2021-11-18
Payer: COMMERCIAL

## 2021-12-03 ENCOUNTER — NON-PROVIDER VISIT (OUTPATIENT)
Dept: MEDICAL GROUP | Facility: PHYSICIAN GROUP | Age: 56
End: 2021-12-03
Payer: COMMERCIAL

## 2021-12-03 DIAGNOSIS — Z23 NEED FOR VACCINATION: ICD-10-CM

## 2021-12-03 PROCEDURE — 90750 HZV VACC RECOMBINANT IM: CPT | Performed by: PHYSICIAN ASSISTANT

## 2021-12-03 PROCEDURE — 90471 IMMUNIZATION ADMIN: CPT | Performed by: PHYSICIAN ASSISTANT

## 2021-12-09 ENCOUNTER — HOSPITAL ENCOUNTER (OUTPATIENT)
Dept: LAB | Facility: MEDICAL CENTER | Age: 56
End: 2021-12-09
Attending: PHYSICIAN ASSISTANT
Payer: COMMERCIAL

## 2021-12-09 DIAGNOSIS — E03.8 HYPOTHYROIDISM DUE TO HASHIMOTO'S THYROIDITIS: ICD-10-CM

## 2021-12-09 DIAGNOSIS — E06.3 HYPOTHYROIDISM DUE TO HASHIMOTO'S THYROIDITIS: ICD-10-CM

## 2021-12-09 LAB
T3FREE SERPL-MCNC: 2.96 PG/ML (ref 2–4.4)
T4 FREE SERPL-MCNC: 1.31 NG/DL (ref 0.93–1.7)
TSH SERPL DL<=0.005 MIU/L-ACNC: 0.05 UIU/ML (ref 0.38–5.33)

## 2021-12-09 PROCEDURE — 84443 ASSAY THYROID STIM HORMONE: CPT

## 2021-12-09 PROCEDURE — 84481 FREE ASSAY (FT-3): CPT

## 2021-12-09 PROCEDURE — 84439 ASSAY OF FREE THYROXINE: CPT

## 2021-12-09 PROCEDURE — 36415 COLL VENOUS BLD VENIPUNCTURE: CPT

## 2021-12-15 ENCOUNTER — TELEMEDICINE (OUTPATIENT)
Dept: MEDICAL GROUP | Facility: PHYSICIAN GROUP | Age: 56
End: 2021-12-15
Payer: COMMERCIAL

## 2021-12-15 VITALS — RESPIRATION RATE: 16 BRPM | WEIGHT: 175 LBS | BODY MASS INDEX: 33.04 KG/M2 | HEIGHT: 61 IN

## 2021-12-15 DIAGNOSIS — E03.8 HYPOTHYROIDISM DUE TO HASHIMOTO'S THYROIDITIS: ICD-10-CM

## 2021-12-15 DIAGNOSIS — R53.83 OTHER FATIGUE: ICD-10-CM

## 2021-12-15 DIAGNOSIS — E06.3 HYPOTHYROIDISM DUE TO HASHIMOTO'S THYROIDITIS: ICD-10-CM

## 2021-12-15 DIAGNOSIS — E78.2 MIXED HYPERLIPIDEMIA: ICD-10-CM

## 2021-12-15 PROCEDURE — 99214 OFFICE O/P EST MOD 30 MIN: CPT | Mod: 95 | Performed by: PHYSICIAN ASSISTANT

## 2021-12-15 ASSESSMENT — FIBROSIS 4 INDEX: FIB4 SCORE: 0.65

## 2021-12-15 NOTE — ASSESSMENT & PLAN NOTE
Patient's last TSH October 28, 2021 was at 0.020.  We adjusted her medication to decrease to 75 mcg 1 tab 5 days a week and 88 half a tab twice a week.  She continues Cytomel 10 mcg nightly.  Patient's TSH December 9, 2021 was improved but still slightly low at 0.050.  Free T3 is at 2.96, free T4 is at 1.31.  We have been tapering her medications down slowly to try to get her euthyroid. Patient was previously followed by Dr. Clinton and we took over her care in June 2021.    Patient notice that reducing the thyroid medication she's having more hair loss, insomnia, and dry skin.     Discussed treatment options of increasing T3 and reducing levothyroxine further. But she did inform me she has been taking cytomel at night with other medications.     Patient recently read a blog on hashimotos, she inquired about dietary supplements or diet to follow for this. She did change from B12 injection monthly to B complex- discussed risks and benefits. Has B1, B2, B12, niacin, B6, folate.

## 2021-12-15 NOTE — PROGRESS NOTES
Virtual Visit: Established Patient   This visit was conducted via Zoom using secure and encrypted videoconferencing technology. The patient was in a private location in the state of [place].    The patient's identity was confirmed and verbal consent was obtained for this virtual visit.    Subjective:   CC:   Chief Complaint   Patient presents with   • Follow-Up     labs, dietary supplement     • Nutrition Counseling     Dietary supplement       Ester Aden is a 56 y.o. female presenting for evaluation and management of:     Hypothyroidism due to Hashimoto's thyroiditis  Patient's last TSH October 28, 2021 was at 0.020.  We adjusted her medication to decrease to 75 mcg 1 tab 5 days a week and 88 half a tab twice a week.  She continues Cytomel 10 mcg nightly.  Patient's TSH December 9, 2021 was improved but still slightly low at 0.050.  Free T3 is at 2.96, free T4 is at 1.31.  We have been tapering her medications down slowly to try to get her euthyroid. Patient was previously followed by Dr. Clinton and we took over her care in June 2021.    Patient notice that reducing the thyroid medication she's having more hair loss, insomnia, and dry skin.     Discussed treatment options of increasing T3 and reducing levothyroxine further. But she did inform me she has been taking cytomel at night with other medications.     Patient recently read a blog on hashimotos, she inquired about dietary supplements or diet to follow for this. She did change from B12 injection monthly to B complex- discussed risks and benefits. Has B1, B2, B12, niacin, B6, folate.     Mixed hyperlipidemia  This is a chronic condition.   Latest Labs:   Lab Results   Component Value Date/Time    CHOLSTRLTOT 198 10/28/2021 07:31 AM     (H) 10/28/2021 07:31 AM    HDL 56 10/28/2021 07:31 AM    TRIGLYCERIDE 136 10/28/2021 07:31 AM      Medications: none, improved LDL from previous.     Risk calculator: The 10-year ASCVD risk score (Cathy ORDONEZ Jr.,  et al., 2013) is: 2.3%         ROS   Denies any recent fevers or chills. No nausea or vomiting. No chest pains or shortness of breath.     No Known Allergies    Current medicines (including changes today)  Current Outpatient Medications   Medication Sig Dispense Refill   • B Complex Vitamins (VITAMIN B COMPLEX PO) Take  by mouth.     • levothyroxine (SYNTHROID) 75 MCG Tab Take 1 tab daily Sunday, Tuesday, Wednesday, Friday and Saturday, and 1/2 tab daily Monday and Thursday. 30 Tablet 2   • montelukast (SINGULAIR) 10 MG Tab TAKE 1 TABLET EVERY EVENING 90 Tablet 3   • liothyronine (CYTOMEL) 5 MCG Tab Take 2 tabs p.o. nightly. 180 Tablet 1   • meloxicam (MOBIC) 15 MG tablet meloxicam 15 mg tablet     • vitamin D (CHOLECALCIFEROL) 1000 UNIT Tab Take 3,000 Units by mouth every evening.     • Probiotic Product (PROBIOTIC DAILY PO) Take 2 Tabs by mouth every evening.     • Multiple Vitamins-Minerals (ONE-A-DAY 50 PLUS PO) Take 1 Tab by mouth every evening.     • Calcium Carb-Cholecalciferol (CALCIUM 600 + D PO) Take 1 Tab by mouth every evening.       No current facility-administered medications for this visit.       Patient Active Problem List    Diagnosis Date Noted   • Bilateral thumb pain 09/04/2020   • Elevated LFTs 09/04/2020   • Prediabetes 06/04/2020   • Mixed hyperlipidemia 06/04/2020   • Fibroid, uterine 02/02/2018   • Obesity (BMI 30-39.9) 02/02/2018   • Hypothyroidism due to Hashimoto's thyroiditis 02/02/2018   • Pure hypercholesterolemia 02/02/2018   • Snoring 02/02/2018   • Perimenopausal 01/06/2017   • Dyslipidemia 01/06/2017   • Mild intermittent asthma without complication 01/05/2017       Family History   Problem Relation Age of Onset   • Arthritis Mother    • Hypertension Mother    • Hyperlipidemia Mother    • Arthritis Father    • Hypertension Father    • Hyperlipidemia Father    • Cancer Paternal Aunt         breast cancer       She  has a past medical history of Asthma, Bronchitis (2015), and Thyroid  "disease.  She  has a past surgical history that includes hysterectomy robotic xi (N/A, 10/2/2019); cystoscopy (N/A, 10/2/2019); and salpingectomy (Bilateral, 10/2/2019).       Objective:   Resp 16   Ht 1.549 m (5' 1\") Comment: per pt  Wt 79.4 kg (175 lb) Comment: per pt  LMP 09/04/2019   BMI 33.07 kg/m²     Physical Exam:  Constitutional: Alert, no distress, well-groomed.  Skin: No rashes in visible areas.  Eye: Round. Conjunctiva clear, lids normal. No icterus.   ENMT: Lips pink without lesions, good dentition, moist mucous membranes. Phonation normal.  Neck: No masses, no thyromegaly. Moves freely without pain.  Respiratory: Unlabored respiratory effort, no cough or audible wheeze  Psych: Alert and oriented x3, normal affect and mood.       Assessment and Plan:   The following treatment plan was discussed:     1. Hypothyroidism due to Hashimoto's thyroiditis  - TSH; Future  - T3 FREE; Future  - FREE THYROXINE; Future  - THYROID PEROXIDASE  (TPO) AB; Future  TSH improving but still not within normal limits.  I would like the patient to reduce her levothyroxine to 75 mcg 5 days a week and 1/2 tablet 75 mcg twice a week.  Patient is having some significant hypothyroid symptoms including hair loss, insomnia and dry skin.  Her T3 did drop quite a bit from the previous lab.  Discussed we could trial going up on her Cytomel, however she did inform me today that she is taking her Cytomel with other medications in the evening.  Would like her to try to take the Cytomel on empty stomach at night see if this improves her T3 levels and her symptoms.  Would recommend that we repeat her thyroid labs again in 6 to 8 weeks to look for improvement and normalization.  Patient also inquired about supplements.  She did start taking a selenium supplement, discussed with patient this is a gray area as far as efficacy with Hashimoto's.  We can get a TPO with her next set of labs to see if there is any change or reduction as she " supplements selenium.  2. Mixed hyperlipidemia  Cholesterol improved compared to previous, will continue to monitor.  3. Other fatigue  - VITAMIN B12; Future  Patient was having some reduced energy she did start taking a B complex, discontinued her B12 shots.  Discussed with patient risks and benefits of supplement medication of B vitamins.  If she does continue to supplement I would like to get a B12 level with her next set of labs.  Other orders  - B Complex Vitamins (VITAMIN B COMPLEX PO); Take  by mouth.        Follow-up: Return in about 8 weeks (around 2/9/2022).        The patient verbalized agreement and understanding of current plan. All questions and concerns were addressed at time of visit.    Please note that this dictation was created using voice recognition software. I have made every reasonable attempt to correct obvious errors, but I expect that there are errors of grammar and possibly content that I did not discover before finalizing the note.

## 2021-12-15 NOTE — ASSESSMENT & PLAN NOTE
This is a chronic condition.   Latest Labs:   Lab Results   Component Value Date/Time    CHOLSTRLTOT 198 10/28/2021 07:31 AM     (H) 10/28/2021 07:31 AM    HDL 56 10/28/2021 07:31 AM    TRIGLYCERIDE 136 10/28/2021 07:31 AM      Medications: none, improved LDL from previous.     Risk calculator: The 10-year ASCVD risk score ([employer]ma[employer]icio ORDONEZ Jr., et al., 2013) is: 2.3%

## 2022-02-11 ENCOUNTER — HOSPITAL ENCOUNTER (OUTPATIENT)
Dept: LAB | Facility: MEDICAL CENTER | Age: 57
End: 2022-02-11
Attending: PHYSICIAN ASSISTANT
Payer: COMMERCIAL

## 2022-02-11 DIAGNOSIS — E03.8 HYPOTHYROIDISM DUE TO HASHIMOTO'S THYROIDITIS: ICD-10-CM

## 2022-02-11 DIAGNOSIS — R53.83 OTHER FATIGUE: ICD-10-CM

## 2022-02-11 DIAGNOSIS — E06.3 HYPOTHYROIDISM DUE TO HASHIMOTO'S THYROIDITIS: ICD-10-CM

## 2022-02-11 LAB
T3FREE SERPL-MCNC: 4.28 PG/ML (ref 2–4.4)
T4 FREE SERPL-MCNC: 1.12 NG/DL (ref 0.93–1.7)
THYROPEROXIDASE AB SERPL-ACNC: 40 IU/ML (ref 0–9)
TSH SERPL DL<=0.005 MIU/L-ACNC: 0.02 UIU/ML (ref 0.38–5.33)
VIT B12 SERPL-MCNC: 1139 PG/ML (ref 211–911)

## 2022-02-11 PROCEDURE — 84481 FREE ASSAY (FT-3): CPT

## 2022-02-11 PROCEDURE — 84443 ASSAY THYROID STIM HORMONE: CPT

## 2022-02-11 PROCEDURE — 82607 VITAMIN B-12: CPT

## 2022-02-11 PROCEDURE — 84439 ASSAY OF FREE THYROXINE: CPT

## 2022-02-11 PROCEDURE — 36415 COLL VENOUS BLD VENIPUNCTURE: CPT

## 2022-02-11 PROCEDURE — 86376 MICROSOMAL ANTIBODY EACH: CPT

## 2022-02-14 NOTE — PROGRESS NOTES
"Ester Aden is a 56 y.o. female here for a non-provider visit for:   SHINGRIX (Shingles)    Reason for immunization: continue or complete series started at the office  Immunization records indicate need for vaccine: Yes, confirmed with Epic  Minimum interval has been met for this vaccine: Yes  ABN completed: Not Indicated    VIS Dated 10/30/2019  was given to patient: Yes  All IAC Questionnaire questions were answered \"No.\"    Patient tolerated injection and no adverse effects were observed or reported: Yes    Pt scheduled for next dose in series: Not Indicated  " Canceled

## 2022-02-17 ENCOUNTER — TELEPHONE (OUTPATIENT)
Dept: MEDICAL GROUP | Facility: PHYSICIAN GROUP | Age: 57
End: 2022-02-17
Payer: COMMERCIAL

## 2022-02-18 NOTE — TELEPHONE ENCOUNTER
----- Message from MARGIE Fontaine sent at 2/15/2022 11:37 AM PST -----  Please call the patient and schedule her an appointment to establish with a new PCP so she can go over lab results.

## 2022-03-25 ENCOUNTER — HOSPITAL ENCOUNTER (OUTPATIENT)
Dept: LAB | Facility: MEDICAL CENTER | Age: 57
End: 2022-03-25
Attending: PHYSICIAN ASSISTANT
Payer: COMMERCIAL

## 2022-03-25 ENCOUNTER — HOSPITAL ENCOUNTER (OUTPATIENT)
Dept: RADIOLOGY | Facility: MEDICAL CENTER | Age: 57
End: 2022-03-25
Attending: PHYSICIAN ASSISTANT
Payer: COMMERCIAL

## 2022-03-25 DIAGNOSIS — Z12.31 VISIT FOR SCREENING MAMMOGRAM: ICD-10-CM

## 2022-03-25 LAB
T3FREE SERPL-MCNC: 4.39 PG/ML (ref 2–4.4)
THYROPEROXIDASE AB SERPL-ACNC: 49 IU/ML (ref 0–9)
TSH SERPL DL<=0.005 MIU/L-ACNC: 0.03 UIU/ML (ref 0.38–5.33)

## 2022-03-25 PROCEDURE — 84439 ASSAY OF FREE THYROXINE: CPT

## 2022-03-25 PROCEDURE — 84481 FREE ASSAY (FT-3): CPT

## 2022-03-25 PROCEDURE — 77063 BREAST TOMOSYNTHESIS BI: CPT

## 2022-03-25 PROCEDURE — 84443 ASSAY THYROID STIM HORMONE: CPT

## 2022-03-25 PROCEDURE — 84445 ASSAY OF TSI GLOBULIN: CPT

## 2022-03-25 PROCEDURE — 36415 COLL VENOUS BLD VENIPUNCTURE: CPT

## 2022-03-25 PROCEDURE — 86376 MICROSOMAL ANTIBODY EACH: CPT

## 2022-03-25 PROCEDURE — 83520 IMMUNOASSAY QUANT NOS NONAB: CPT

## 2022-03-28 LAB — TSH RECEP AB SER-ACNC: <0.8 IU/L

## 2022-03-31 LAB — TSI SER-ACNC: <0.1 IU/L

## 2022-04-04 LAB — T4 FREE SERPL DIALY-MCNC: 1.5 NG/DL (ref 1.1–2.4)

## 2022-04-05 ENCOUNTER — HOSPITAL ENCOUNTER (OUTPATIENT)
Dept: RADIOLOGY | Facility: MEDICAL CENTER | Age: 57
End: 2022-04-05
Attending: PHYSICIAN ASSISTANT
Payer: COMMERCIAL

## 2022-04-05 DIAGNOSIS — E05.80 HYPERTHYROIDISM WITH HASHIMOTO DISEASE: ICD-10-CM

## 2022-04-05 DIAGNOSIS — E06.3 HYPERTHYROIDISM WITH HASHIMOTO DISEASE: ICD-10-CM

## 2022-04-05 PROCEDURE — 76536 US EXAM OF HEAD AND NECK: CPT

## 2022-05-06 ENCOUNTER — HOSPITAL ENCOUNTER (OUTPATIENT)
Dept: RADIOLOGY | Facility: MEDICAL CENTER | Age: 57
End: 2022-05-06
Attending: PHYSICIAN ASSISTANT
Payer: COMMERCIAL

## 2022-05-06 DIAGNOSIS — R92.2 DENSE BREASTS: ICD-10-CM

## 2022-05-06 DIAGNOSIS — R92.30 DENSE BREASTS: ICD-10-CM

## 2022-05-06 PROCEDURE — 76641 ULTRASOUND BREAST COMPLETE: CPT

## 2022-06-16 ENCOUNTER — HOSPITAL ENCOUNTER (OUTPATIENT)
Dept: LAB | Facility: MEDICAL CENTER | Age: 57
End: 2022-06-16
Attending: PHYSICIAN ASSISTANT
Payer: COMMERCIAL

## 2022-06-16 LAB
ALBUMIN SERPL BCP-MCNC: 4.6 G/DL (ref 3.2–4.9)
ALBUMIN/GLOB SERPL: 1.7 G/DL
ALP SERPL-CCNC: 66 U/L (ref 30–99)
ALT SERPL-CCNC: 16 U/L (ref 2–50)
ANION GAP SERPL CALC-SCNC: 11 MMOL/L (ref 7–16)
AST SERPL-CCNC: 21 U/L (ref 12–45)
BASOPHILS # BLD AUTO: 0.8 % (ref 0–1.8)
BASOPHILS # BLD: 0.06 K/UL (ref 0–0.12)
BILIRUB SERPL-MCNC: 0.6 MG/DL (ref 0.1–1.5)
BUN SERPL-MCNC: 20 MG/DL (ref 8–22)
CALCIUM SERPL-MCNC: 10.1 MG/DL (ref 8.5–10.5)
CHLORIDE SERPL-SCNC: 102 MMOL/L (ref 96–112)
CHOLEST SERPL-MCNC: 207 MG/DL (ref 100–199)
CO2 SERPL-SCNC: 26 MMOL/L (ref 20–33)
CREAT SERPL-MCNC: 1.04 MG/DL (ref 0.5–1.4)
EOSINOPHIL # BLD AUTO: 0.26 K/UL (ref 0–0.51)
EOSINOPHIL NFR BLD: 3.3 % (ref 0–6.9)
ERYTHROCYTE [DISTWIDTH] IN BLOOD BY AUTOMATED COUNT: 44.5 FL (ref 35.9–50)
FASTING STATUS PATIENT QL REPORTED: NORMAL
GFR SERPLBLD CREATININE-BSD FMLA CKD-EPI: 62 ML/MIN/1.73 M 2
GLOBULIN SER CALC-MCNC: 2.7 G/DL (ref 1.9–3.5)
GLUCOSE SERPL-MCNC: 102 MG/DL (ref 65–99)
HCT VFR BLD AUTO: 43.7 % (ref 37–47)
HDLC SERPL-MCNC: 60 MG/DL
HGB BLD-MCNC: 14.8 G/DL (ref 12–16)
IMM GRANULOCYTES # BLD AUTO: 0.02 K/UL (ref 0–0.11)
IMM GRANULOCYTES NFR BLD AUTO: 0.3 % (ref 0–0.9)
LDLC SERPL CALC-MCNC: 124 MG/DL
LYMPHOCYTES # BLD AUTO: 3.53 K/UL (ref 1–4.8)
LYMPHOCYTES NFR BLD: 44.4 % (ref 22–41)
MCH RBC QN AUTO: 32.1 PG (ref 27–33)
MCHC RBC AUTO-ENTMCNC: 33.9 G/DL (ref 33.6–35)
MCV RBC AUTO: 94.8 FL (ref 81.4–97.8)
MONOCYTES # BLD AUTO: 0.54 K/UL (ref 0–0.85)
MONOCYTES NFR BLD AUTO: 6.8 % (ref 0–13.4)
NEUTROPHILS # BLD AUTO: 3.54 K/UL (ref 2–7.15)
NEUTROPHILS NFR BLD: 44.4 % (ref 44–72)
NRBC # BLD AUTO: 0 K/UL
NRBC BLD-RTO: 0 /100 WBC
PLATELET # BLD AUTO: 318 K/UL (ref 164–446)
PMV BLD AUTO: 11.6 FL (ref 9–12.9)
POTASSIUM SERPL-SCNC: 4.5 MMOL/L (ref 3.6–5.5)
PROT SERPL-MCNC: 7.3 G/DL (ref 6–8.2)
RBC # BLD AUTO: 4.61 M/UL (ref 4.2–5.4)
SODIUM SERPL-SCNC: 139 MMOL/L (ref 135–145)
T3FREE SERPL-MCNC: 3.32 PG/ML (ref 2–4.4)
TRIGL SERPL-MCNC: 113 MG/DL (ref 0–149)
TSH SERPL DL<=0.005 MIU/L-ACNC: 2.05 UIU/ML (ref 0.38–5.33)
WBC # BLD AUTO: 8 K/UL (ref 4.8–10.8)

## 2022-06-16 PROCEDURE — 80061 LIPID PANEL: CPT

## 2022-06-16 PROCEDURE — 84481 FREE ASSAY (FT-3): CPT

## 2022-06-16 PROCEDURE — 85025 COMPLETE CBC W/AUTO DIFF WBC: CPT

## 2022-06-16 PROCEDURE — 84443 ASSAY THYROID STIM HORMONE: CPT

## 2022-06-16 PROCEDURE — 80053 COMPREHEN METABOLIC PANEL: CPT

## 2022-06-16 PROCEDURE — 84439 ASSAY OF FREE THYROXINE: CPT

## 2022-06-16 PROCEDURE — 36415 COLL VENOUS BLD VENIPUNCTURE: CPT

## 2022-06-20 LAB — T4 FREE SERPL DIALY-MCNC: 1.6 NG/DL (ref 1.1–2.4)

## 2022-09-16 ENCOUNTER — HOSPITAL ENCOUNTER (OUTPATIENT)
Dept: LAB | Facility: MEDICAL CENTER | Age: 57
End: 2022-09-16
Attending: PHYSICIAN ASSISTANT
Payer: COMMERCIAL

## 2022-09-16 LAB
T3FREE SERPL-MCNC: 3.01 PG/ML (ref 2–4.4)
TSH SERPL DL<=0.005 MIU/L-ACNC: 1.66 UIU/ML (ref 0.38–5.33)

## 2022-09-16 PROCEDURE — 84481 FREE ASSAY (FT-3): CPT

## 2022-09-16 PROCEDURE — 84443 ASSAY THYROID STIM HORMONE: CPT

## 2022-09-16 PROCEDURE — 36415 COLL VENOUS BLD VENIPUNCTURE: CPT

## 2022-09-16 PROCEDURE — 84439 ASSAY OF FREE THYROXINE: CPT

## 2022-09-22 LAB — T4 FREE SERPL DIALY-MCNC: 1.8 NG/DL (ref 1.1–2.4)

## 2023-09-23 NOTE — ANESTHESIA TIME REPORT
Anesthesia Start and Stop Event Times     Date Time Event    10/2/2019 1301 Ready for Procedure     1346 Anesthesia Start     1556 Anesthesia Stop        Responsible Staff  10/02/19    Name Role Begin End    Julita Beckett M.D. Anesth 1346 1556        Preop Diagnosis (Free Text):  Pre-op Diagnosis     ENLARGED UTERUS, FIBROIDS, PELVIC PAIN AND MENORRHAGIA        Preop Diagnosis (Codes):    Post op Diagnosis  Enlarged uterus  menorrhagia    Premium Reason  A. 3PM - 7AM    Comments:                                                                        PAST SURGICAL HISTORY:  Elective surgery bilateral iliac stents    Elective surgery unilateral oopherectomy    History of cholecystectomy     History of right coronary artery stent placement     History of tonsillectomy

## 2023-10-05 ENCOUNTER — HOSPITAL ENCOUNTER (OUTPATIENT)
Dept: RADIOLOGY | Facility: MEDICAL CENTER | Age: 58
End: 2023-10-05
Attending: PHYSICIAN ASSISTANT
Payer: COMMERCIAL

## 2023-10-05 DIAGNOSIS — Z12.31 VISIT FOR SCREENING MAMMOGRAM: ICD-10-CM

## 2023-10-05 PROCEDURE — 77063 BREAST TOMOSYNTHESIS BI: CPT

## 2023-10-11 ENCOUNTER — HOSPITAL ENCOUNTER (OUTPATIENT)
Dept: RADIOLOGY | Facility: MEDICAL CENTER | Age: 58
End: 2023-10-11
Attending: PHYSICIAN ASSISTANT
Payer: COMMERCIAL

## 2023-10-11 DIAGNOSIS — R92.8 ABNORMAL MAMMOGRAM: ICD-10-CM

## 2023-10-11 PROCEDURE — 76642 ULTRASOUND BREAST LIMITED: CPT | Mod: RT

## 2023-10-11 PROCEDURE — G0279 TOMOSYNTHESIS, MAMMO: HCPCS

## 2024-07-03 ENCOUNTER — APPOINTMENT (RX ONLY)
Dept: URBAN - METROPOLITAN AREA CLINIC 22 | Facility: CLINIC | Age: 59
Setting detail: DERMATOLOGY
End: 2024-07-03

## 2024-07-03 DIAGNOSIS — L30.8 OTHER SPECIFIED DERMATITIS: ICD-10-CM

## 2024-07-03 DIAGNOSIS — L85.1 ACQUIRED KERATOSIS [KERATODERMA] PALMARIS ET PLANTARIS: ICD-10-CM | Status: INADEQUATELY CONTROLLED

## 2024-07-03 PROBLEM — L30.9 DERMATITIS, UNSPECIFIED: Status: ACTIVE | Noted: 2024-07-03

## 2024-07-03 PROCEDURE — ? BIOPSY BY SHAVE METHOD

## 2024-07-03 PROCEDURE — ? COUNSELING

## 2024-07-03 PROCEDURE — ? PRESCRIPTION

## 2024-07-03 PROCEDURE — 99203 OFFICE O/P NEW LOW 30 MIN: CPT | Mod: 25

## 2024-07-03 PROCEDURE — 11102 TANGNTL BX SKIN SINGLE LES: CPT

## 2024-07-03 PROCEDURE — ? ADDITIONAL NOTES

## 2024-07-03 RX ORDER — CLOBETASOL PROPIONATE 0.5 MG/G
OINTMENT TOPICAL BID
Qty: 60 | Refills: 1 | Status: ERX | COMMUNITY
Start: 2024-07-03

## 2024-07-03 RX ADMIN — CLOBETASOL PROPIONATE: 0.5 OINTMENT TOPICAL at 00:00

## 2024-07-03 ASSESSMENT — LOCATION DETAILED DESCRIPTION DERM: LOCATION DETAILED: RIGHT PROXIMAL ULNAR PALMAR SMALL FINGER

## 2024-07-03 ASSESSMENT — LOCATION ZONE DERM: LOCATION ZONE: FINGER

## 2024-07-03 ASSESSMENT — LOCATION SIMPLE DESCRIPTION DERM: LOCATION SIMPLE: RIGHT SMALL FINGER

## 2024-07-03 NOTE — PROCEDURE: BIOPSY BY SHAVE METHOD

## 2024-07-03 NOTE — PROCEDURE: ADDITIONAL NOTES
Additional Notes: Patient states she has really noticed these changes to her hands/feet in last several years. \\nNO hx of as a child that she can recall, no FH. \\nNo pain, provides photos showing when it was more profound than today \\nShe has been using emollients without great relief at this point.   Some irritation/itching.   Agreed to bx due to distinct presentation
Detail Level: Simple
Render Risk Assessment In Note?: no

## 2024-07-11 ENCOUNTER — APPOINTMENT (RX ONLY)
Dept: URBAN - METROPOLITAN AREA CLINIC 22 | Facility: CLINIC | Age: 59
Setting detail: DERMATOLOGY
End: 2024-07-11

## 2024-07-11 DIAGNOSIS — L85.1 ACQUIRED KERATOSIS [KERATODERMA] PALMARIS ET PLANTARIS: ICD-10-CM

## 2024-07-11 PROCEDURE — ? ORDER TESTS

## 2024-07-11 NOTE — PROCEDURE: ORDER TESTS
Expected Date Of Service: 07/10/2024
Lab Facility: [id number]
Billing Type: Third-Party Bill
Performing Laboratory: [id number]
Bill For Surgical Tray: no

## 2024-11-14 ENCOUNTER — HOSPITAL ENCOUNTER (OUTPATIENT)
Dept: RADIOLOGY | Facility: MEDICAL CENTER | Age: 59
End: 2024-11-14
Attending: PHYSICIAN ASSISTANT
Payer: COMMERCIAL

## 2024-11-14 DIAGNOSIS — Z12.31 ENCOUNTER FOR SCREENING MAMMOGRAM FOR MALIGNANT NEOPLASM OF BREAST: ICD-10-CM

## 2024-11-14 PROCEDURE — 77067 SCR MAMMO BI INCL CAD: CPT

## 2024-11-22 ENCOUNTER — APPOINTMENT (RX ONLY)
Dept: URBAN - METROPOLITAN AREA CLINIC 22 | Facility: CLINIC | Age: 59
Setting detail: DERMATOLOGY
End: 2024-11-22

## 2024-11-22 DIAGNOSIS — D22 MELANOCYTIC NEVI: ICD-10-CM

## 2024-11-22 DIAGNOSIS — L85.1 ACQUIRED KERATOSIS [KERATODERMA] PALMARIS ET PLANTARIS: ICD-10-CM

## 2024-11-22 DIAGNOSIS — Z71.89 OTHER SPECIFIED COUNSELING: ICD-10-CM

## 2024-11-22 DIAGNOSIS — L81.4 OTHER MELANIN HYPERPIGMENTATION: ICD-10-CM

## 2024-11-22 DIAGNOSIS — D18.0 HEMANGIOMA: ICD-10-CM

## 2024-11-22 DIAGNOSIS — L82.1 OTHER SEBORRHEIC KERATOSIS: ICD-10-CM

## 2024-11-22 PROBLEM — D48.5 NEOPLASM OF UNCERTAIN BEHAVIOR OF SKIN: Status: ACTIVE | Noted: 2024-11-22

## 2024-11-22 PROBLEM — D18.01 HEMANGIOMA OF SKIN AND SUBCUTANEOUS TISSUE: Status: ACTIVE | Noted: 2024-11-22

## 2024-11-22 PROBLEM — D22.5 MELANOCYTIC NEVI OF TRUNK: Status: ACTIVE | Noted: 2024-11-22

## 2024-11-22 PROCEDURE — ? ADDITIONAL NOTES

## 2024-11-22 PROCEDURE — 99213 OFFICE O/P EST LOW 20 MIN: CPT | Mod: 25

## 2024-11-22 PROCEDURE — 11102 TANGNTL BX SKIN SINGLE LES: CPT

## 2024-11-22 PROCEDURE — ? BIOPSY BY SHAVE METHOD

## 2024-11-22 PROCEDURE — ? SUNSCREEN RECOMMENDATIONS

## 2024-11-22 PROCEDURE — ? COUNSELING

## 2024-11-22 ASSESSMENT — LOCATION DETAILED DESCRIPTION DERM
LOCATION DETAILED: LEFT LATERAL ABDOMEN
LOCATION DETAILED: RIGHT MID-UPPER BACK
LOCATION DETAILED: LEFT INFERIOR UPPER BACK
LOCATION DETAILED: LEFT PROXIMAL DORSAL FOREARM

## 2024-11-22 ASSESSMENT — LOCATION ZONE DERM
LOCATION ZONE: ARM
LOCATION ZONE: TRUNK

## 2024-11-22 ASSESSMENT — LOCATION SIMPLE DESCRIPTION DERM
LOCATION SIMPLE: LEFT UPPER BACK
LOCATION SIMPLE: LEFT FOREARM
LOCATION SIMPLE: ABDOMEN
LOCATION SIMPLE: RIGHT UPPER BACK

## 2024-11-22 NOTE — PROCEDURE: ADDITIONAL NOTES
Detail Level: Simple
Additional Notes: Pt has been using CeraVe SA and skin is somewhat softer today. Discussed La Roche Posay cream with Urea or trial of a 40% OTC Urea. \\nPt reports that she has undergone testing on thyroid, lungs, and blood work and all results are negative. She has had imaging to r/o malignancy. \\nShe states she did speak with her mother and she may also have similar on her hands and feet, suggesting possible hereditary vs acquired as previously thought.  She will continue with regular screenings with her PCP.
Render Risk Assessment In Note?: no

## 2024-12-04 ENCOUNTER — APPOINTMENT (OUTPATIENT)
Dept: ADMISSIONS | Facility: MEDICAL CENTER | Age: 59
End: 2024-12-04
Attending: ORTHOPAEDIC SURGERY
Payer: COMMERCIAL

## 2024-12-06 ENCOUNTER — PRE-ADMISSION TESTING (OUTPATIENT)
Dept: ADMISSIONS | Facility: MEDICAL CENTER | Age: 59
End: 2024-12-06
Attending: ORTHOPAEDIC SURGERY
Payer: COMMERCIAL

## 2024-12-06 VITALS — HEIGHT: 61 IN | BODY MASS INDEX: 32.69 KG/M2

## 2024-12-06 DIAGNOSIS — Z01.812 PRE-OPERATIVE LABORATORY EXAMINATION: ICD-10-CM

## 2024-12-06 DIAGNOSIS — Z01.810 PRE-OPERATIVE CARDIOVASCULAR EXAMINATION: ICD-10-CM

## 2024-12-06 RX ORDER — ALBUTEROL SULFATE 90 UG/1
2 INHALANT RESPIRATORY (INHALATION) EVERY 6 HOURS PRN
COMMUNITY

## 2024-12-06 RX ORDER — PROGESTERONE 200 MG/1
100 CAPSULE ORAL
COMMUNITY
Start: 2022-10-01

## 2024-12-06 RX ORDER — ROSUVASTATIN CALCIUM 5 MG/1
5 TABLET, COATED ORAL DAILY
COMMUNITY
Start: 2024-10-30

## 2024-12-06 RX ORDER — CETIRIZINE HYDROCHLORIDE 10 MG/1
10 TABLET ORAL
COMMUNITY

## 2024-12-06 RX ORDER — SPIRONOLACTONE 25 MG/1
25 TABLET ORAL DAILY
COMMUNITY
Start: 2024-09-25

## 2024-12-06 RX ORDER — IBUPROFEN 200 MG
400 TABLET ORAL EVERY 6 HOURS PRN
COMMUNITY

## 2024-12-06 RX ORDER — SENNOSIDES 8.6 MG
650 CAPSULE ORAL EVERY 6 HOURS PRN
COMMUNITY

## 2024-12-10 ENCOUNTER — PRE-ADMISSION TESTING (OUTPATIENT)
Dept: ADMISSIONS | Facility: MEDICAL CENTER | Age: 59
End: 2024-12-10
Attending: ORTHOPAEDIC SURGERY
Payer: COMMERCIAL

## 2024-12-10 DIAGNOSIS — Z01.810 PRE-OPERATIVE CARDIOVASCULAR EXAMINATION: ICD-10-CM

## 2024-12-10 DIAGNOSIS — Z01.812 PRE-OPERATIVE LABORATORY EXAMINATION: ICD-10-CM

## 2024-12-10 LAB
ANION GAP SERPL CALC-SCNC: 12 MMOL/L (ref 7–16)
BUN SERPL-MCNC: 11 MG/DL (ref 8–22)
CALCIUM SERPL-MCNC: 9.5 MG/DL (ref 8.4–10.2)
CHLORIDE SERPL-SCNC: 99 MMOL/L (ref 96–112)
CO2 SERPL-SCNC: 27 MMOL/L (ref 20–33)
CREAT SERPL-MCNC: 0.97 MG/DL (ref 0.5–1.4)
EKG IMPRESSION: NORMAL
EST. AVERAGE GLUCOSE BLD GHB EST-MCNC: 137 MG/DL
GFR SERPLBLD CREATININE-BSD FMLA CKD-EPI: 67 ML/MIN/1.73 M 2
GLUCOSE SERPL-MCNC: 162 MG/DL (ref 65–99)
HBA1C MFR BLD: 6.4 % (ref 4–5.6)
POTASSIUM SERPL-SCNC: 3.7 MMOL/L (ref 3.6–5.5)
SODIUM SERPL-SCNC: 138 MMOL/L (ref 135–145)

## 2024-12-10 PROCEDURE — 93005 ELECTROCARDIOGRAM TRACING: CPT | Mod: TC

## 2024-12-10 PROCEDURE — 80048 BASIC METABOLIC PNL TOTAL CA: CPT

## 2024-12-10 PROCEDURE — 93010 ELECTROCARDIOGRAM REPORT: CPT | Performed by: INTERNAL MEDICINE

## 2024-12-10 PROCEDURE — 36415 COLL VENOUS BLD VENIPUNCTURE: CPT

## 2024-12-10 PROCEDURE — 83036 HEMOGLOBIN GLYCOSYLATED A1C: CPT

## 2024-12-17 ENCOUNTER — ANESTHESIA EVENT (OUTPATIENT)
Dept: SURGERY | Facility: MEDICAL CENTER | Age: 59
End: 2024-12-17
Payer: COMMERCIAL

## 2024-12-17 PROBLEM — M19.90 ARTHRITIS: Status: ACTIVE | Noted: 2024-12-17

## 2024-12-18 ENCOUNTER — ANESTHESIA (OUTPATIENT)
Dept: SURGERY | Facility: MEDICAL CENTER | Age: 59
End: 2024-12-18
Payer: COMMERCIAL

## 2024-12-18 ENCOUNTER — PHARMACY VISIT (OUTPATIENT)
Dept: PHARMACY | Facility: MEDICAL CENTER | Age: 59
End: 2024-12-18
Payer: COMMERCIAL

## 2024-12-18 ENCOUNTER — HOSPITAL ENCOUNTER (OUTPATIENT)
Facility: MEDICAL CENTER | Age: 59
End: 2024-12-18
Attending: ORTHOPAEDIC SURGERY | Admitting: ORTHOPAEDIC SURGERY
Payer: COMMERCIAL

## 2024-12-18 VITALS
TEMPERATURE: 97.5 F | DIASTOLIC BLOOD PRESSURE: 62 MMHG | OXYGEN SATURATION: 97 % | WEIGHT: 159.94 LBS | SYSTOLIC BLOOD PRESSURE: 129 MMHG | RESPIRATION RATE: 16 BRPM | HEART RATE: 50 BPM | BODY MASS INDEX: 30.22 KG/M2

## 2024-12-18 DIAGNOSIS — G56.01 RIGHT CARPAL TUNNEL SYNDROME: ICD-10-CM

## 2024-12-18 PROCEDURE — 700101 HCHG RX REV CODE 250: Performed by: ORTHOPAEDIC SURGERY

## 2024-12-18 PROCEDURE — 160002 HCHG RECOVERY MINUTES (STAT): Performed by: ORTHOPAEDIC SURGERY

## 2024-12-18 PROCEDURE — 700101 HCHG RX REV CODE 250: Performed by: STUDENT IN AN ORGANIZED HEALTH CARE EDUCATION/TRAINING PROGRAM

## 2024-12-18 PROCEDURE — 160046 HCHG PACU - 1ST 60 MINS PHASE II: Performed by: ORTHOPAEDIC SURGERY

## 2024-12-18 PROCEDURE — RXMED WILLOW AMBULATORY MEDICATION CHARGE: Performed by: ORTHOPAEDIC SURGERY

## 2024-12-18 PROCEDURE — 700111 HCHG RX REV CODE 636 W/ 250 OVERRIDE (IP): Mod: JZ | Performed by: STUDENT IN AN ORGANIZED HEALTH CARE EDUCATION/TRAINING PROGRAM

## 2024-12-18 PROCEDURE — 160025 RECOVERY II MINUTES (STATS): Performed by: ORTHOPAEDIC SURGERY

## 2024-12-18 PROCEDURE — 160009 HCHG ANES TIME/MIN: Performed by: ORTHOPAEDIC SURGERY

## 2024-12-18 PROCEDURE — 700105 HCHG RX REV CODE 258: Performed by: ORTHOPAEDIC SURGERY

## 2024-12-18 PROCEDURE — 160048 HCHG OR STATISTICAL LEVEL 1-5: Performed by: ORTHOPAEDIC SURGERY

## 2024-12-18 PROCEDURE — 160029 HCHG SURGERY MINUTES - 1ST 30 MINS LEVEL 4: Performed by: ORTHOPAEDIC SURGERY

## 2024-12-18 PROCEDURE — 160035 HCHG PACU - 1ST 60 MINS PHASE I: Performed by: ORTHOPAEDIC SURGERY

## 2024-12-18 RX ORDER — SODIUM CHLORIDE, SODIUM LACTATE, POTASSIUM CHLORIDE, CALCIUM CHLORIDE 600; 310; 30; 20 MG/100ML; MG/100ML; MG/100ML; MG/100ML
INJECTION, SOLUTION INTRAVENOUS CONTINUOUS
Status: DISCONTINUED | OUTPATIENT
Start: 2024-12-18 | End: 2024-12-18 | Stop reason: HOSPADM

## 2024-12-18 RX ORDER — MIDAZOLAM HYDROCHLORIDE 1 MG/ML
INJECTION INTRAMUSCULAR; INTRAVENOUS PRN
Status: DISCONTINUED | OUTPATIENT
Start: 2024-12-18 | End: 2024-12-18 | Stop reason: SURG

## 2024-12-18 RX ORDER — HYDRALAZINE HYDROCHLORIDE 20 MG/ML
5 INJECTION INTRAMUSCULAR; INTRAVENOUS
Status: DISCONTINUED | OUTPATIENT
Start: 2024-12-18 | End: 2024-12-18 | Stop reason: HOSPADM

## 2024-12-18 RX ORDER — HYDROMORPHONE HYDROCHLORIDE 1 MG/ML
0.4 INJECTION, SOLUTION INTRAMUSCULAR; INTRAVENOUS; SUBCUTANEOUS
Status: DISCONTINUED | OUTPATIENT
Start: 2024-12-18 | End: 2024-12-18 | Stop reason: HOSPADM

## 2024-12-18 RX ORDER — HYDROMORPHONE HYDROCHLORIDE 1 MG/ML
0.2 INJECTION, SOLUTION INTRAMUSCULAR; INTRAVENOUS; SUBCUTANEOUS
Status: DISCONTINUED | OUTPATIENT
Start: 2024-12-18 | End: 2024-12-18 | Stop reason: HOSPADM

## 2024-12-18 RX ORDER — LIDOCAINE HYDROCHLORIDE 20 MG/ML
INJECTION, SOLUTION EPIDURAL; INFILTRATION; INTRACAUDAL; PERINEURAL PRN
Status: DISCONTINUED | OUTPATIENT
Start: 2024-12-18 | End: 2024-12-18 | Stop reason: SURG

## 2024-12-18 RX ORDER — HYDROMORPHONE HYDROCHLORIDE 1 MG/ML
0.1 INJECTION, SOLUTION INTRAMUSCULAR; INTRAVENOUS; SUBCUTANEOUS
Status: DISCONTINUED | OUTPATIENT
Start: 2024-12-18 | End: 2024-12-18 | Stop reason: HOSPADM

## 2024-12-18 RX ORDER — EPHEDRINE SULFATE 50 MG/ML
5 INJECTION, SOLUTION INTRAVENOUS
Status: DISCONTINUED | OUTPATIENT
Start: 2024-12-18 | End: 2024-12-18 | Stop reason: HOSPADM

## 2024-12-18 RX ORDER — LIDOCAINE HYDROCHLORIDE AND EPINEPHRINE 10; 10 MG/ML; UG/ML
INJECTION, SOLUTION INFILTRATION; PERINEURAL
Status: DISCONTINUED | OUTPATIENT
Start: 2024-12-18 | End: 2024-12-18 | Stop reason: HOSPADM

## 2024-12-18 RX ORDER — BUPIVACAINE HYDROCHLORIDE AND EPINEPHRINE 5; 5 MG/ML; UG/ML
INJECTION, SOLUTION EPIDURAL; INTRACAUDAL; PERINEURAL
Status: DISCONTINUED | OUTPATIENT
Start: 2024-12-18 | End: 2024-12-18 | Stop reason: HOSPADM

## 2024-12-18 RX ORDER — DIPHENHYDRAMINE HYDROCHLORIDE 50 MG/ML
12.5 INJECTION INTRAMUSCULAR; INTRAVENOUS
Status: DISCONTINUED | OUTPATIENT
Start: 2024-12-18 | End: 2024-12-18 | Stop reason: HOSPADM

## 2024-12-18 RX ORDER — ONDANSETRON 2 MG/ML
4 INJECTION INTRAMUSCULAR; INTRAVENOUS
Status: DISCONTINUED | OUTPATIENT
Start: 2024-12-18 | End: 2024-12-18 | Stop reason: HOSPADM

## 2024-12-18 RX ORDER — OXYCODONE HCL 5 MG/5 ML
5 SOLUTION, ORAL ORAL
Status: DISCONTINUED | OUTPATIENT
Start: 2024-12-18 | End: 2024-12-18 | Stop reason: HOSPADM

## 2024-12-18 RX ORDER — OXYCODONE HCL 5 MG/5 ML
10 SOLUTION, ORAL ORAL
Status: DISCONTINUED | OUTPATIENT
Start: 2024-12-18 | End: 2024-12-18 | Stop reason: HOSPADM

## 2024-12-18 RX ORDER — ALBUTEROL SULFATE 5 MG/ML
2.5 SOLUTION RESPIRATORY (INHALATION)
Status: DISCONTINUED | OUTPATIENT
Start: 2024-12-18 | End: 2024-12-18 | Stop reason: HOSPADM

## 2024-12-18 RX ORDER — HALOPERIDOL 5 MG/ML
1 INJECTION INTRAMUSCULAR
Status: DISCONTINUED | OUTPATIENT
Start: 2024-12-18 | End: 2024-12-18 | Stop reason: HOSPADM

## 2024-12-18 RX ORDER — LABETALOL HYDROCHLORIDE 5 MG/ML
5 INJECTION, SOLUTION INTRAVENOUS
Status: DISCONTINUED | OUTPATIENT
Start: 2024-12-18 | End: 2024-12-18 | Stop reason: HOSPADM

## 2024-12-18 RX ORDER — TRAMADOL HYDROCHLORIDE 50 MG/1
TABLET ORAL
Qty: 16 TABLET | Refills: 0 | OUTPATIENT
Start: 2024-12-18

## 2024-12-18 RX ADMIN — FENTANYL CITRATE 50 MCG: 50 INJECTION, SOLUTION INTRAMUSCULAR; INTRAVENOUS at 15:34

## 2024-12-18 RX ADMIN — MIDAZOLAM HYDROCHLORIDE 2 MG: 1 INJECTION, SOLUTION INTRAMUSCULAR; INTRAVENOUS at 15:23

## 2024-12-18 RX ADMIN — PROPOFOL 20 MG: 10 INJECTION, EMULSION INTRAVENOUS at 15:30

## 2024-12-18 RX ADMIN — LIDOCAINE HYDROCHLORIDE 30 MG: 20 INJECTION, SOLUTION EPIDURAL; INFILTRATION; INTRACAUDAL; PERINEURAL at 15:30

## 2024-12-18 RX ADMIN — SODIUM CHLORIDE, POTASSIUM CHLORIDE, SODIUM LACTATE AND CALCIUM CHLORIDE: 600; 310; 30; 20 INJECTION, SOLUTION INTRAVENOUS at 14:03

## 2024-12-18 RX ADMIN — PROPOFOL 20 MG: 10 INJECTION, EMULSION INTRAVENOUS at 15:33

## 2024-12-18 RX ADMIN — PROPOFOL 20 MG: 10 INJECTION, EMULSION INTRAVENOUS at 15:34

## 2024-12-18 RX ADMIN — PROPOFOL 70 MG: 10 INJECTION, EMULSION INTRAVENOUS at 15:27

## 2024-12-18 RX ADMIN — FENTANYL CITRATE 50 MCG: 50 INJECTION, SOLUTION INTRAMUSCULAR; INTRAVENOUS at 15:27

## 2024-12-18 RX ADMIN — LIDOCAINE HYDROCHLORIDE 30 MG: 20 INJECTION, SOLUTION EPIDURAL; INFILTRATION; INTRACAUDAL; PERINEURAL at 15:34

## 2024-12-18 RX ADMIN — LIDOCAINE HYDROCHLORIDE 0.5 ML: 10 SOLUTION INTRAVENOUS at 14:02

## 2024-12-18 ASSESSMENT — PAIN DESCRIPTION - PAIN TYPE
TYPE: CHRONIC PAIN
TYPE: SURGICAL PAIN

## 2024-12-18 ASSESSMENT — PAIN SCALES - GENERAL: PAIN_LEVEL: 0

## 2024-12-18 NOTE — OR NURSING
Patient allergies and NPO status verified, home medication reconciliation completed and belongings secured. Surgical site verified with patient. Patient verbalizes understanding of pain scale, expected course of stay and plan of care; patient and family state verbal understanding at this time. IV access established. Sequential in place as ordered.

## 2024-12-18 NOTE — OR NURSING
1540: To PACU from OR via gurney, awake, respirations spontaneous and non-labored. Stable on 6L O2 via mask. Ice pack applied over c/d/i right wrist surgical dressings.    1545: Pt denies pain and nausea. Stable on RA. Tolerating po fluids.     1555: Message left for pt .     1600: Pt  updated. Pt remains stable on RA.     1610: Meets criteria to transfer to Stage 2.

## 2024-12-18 NOTE — OP REPORT
DATE OF SURGERY:  12/18/2024     PREOPERATIVE DIAGNOSIS:  Right carpal tunnel syndrome.     POSTOPERATIVE DIAGNOSIS:  Right carpal tunnel syndrome.     SURGERY PERFORMED:  Right endoscopic carpal tunnel release.     SURGEON:  Mike Sweet MD     ANESTHESIA:  Monitored anesthesia care with local.     ASSISTANT:  None     COMPLICATIONS:  None.     TOURNIQUET TIME:  3 min.     TOURNIQUET PRESSURE:  250 mmHg.    INDICATIONS FOR PROCEDURE:  Ester has had    persistent right carpal tunnel syndrome.  She has tried nonoperative    modalities including activity modifications, splints, NSAIDs, and these have    not resolved the symptoms.  After electrodiagnostic studies indicated right    carpal tunnel syndrome with evidence of compression at the wrist, the decision   was made to perform the operative room for the above-mentioned procedure.     DESCRIPTION OF PROCEDURE:  On the day of surgery, Ester was seen in the    preoperative area where informed consent was obtained with all risks and    benefits of the procedure explained and all questions answered.  She wished to   proceed with the surgery.  The proper site was marked. The patient was subsequently    taken to the operative room and placed in the supine position with all bony    prominences well padded.  A tourniquet was placed on the right upper    extremity.  It was then prepped and draped in the usual sterile fashion.     A timeout was performed with all persons and attendants agreeing on the proper   patient, proper surgical site, and proper surgery to be performed.     An esmarch was used to exsanguinate the right upper extremity and tourniquet    was insufflated to 250 mmHg.  Monitored anesthesia care was induced and 4 mL    of equal parts of 1% lidocaine with epinephrine and 0.5% bupivacaine with    epinephrine were injected just proximal to the wrist crease.  A transverse    incision was made at the distal wrist crease from the ulnar border of the     palmaris longus ulnarly.  This was roughly 1 cm in length.  Sharp and blunt    dissection was taken down to the level of the forearm fascia.  Skin hooks were   then placed proximally and distally to help retract soft tissues.  The    forearm fascia was visualized and this was incised with a [place] blade.  The    forearm fascia was then retracted distally with a double skin hook.  The    Spatula provided in the [employer] Centerline Endoscope tray was then placed deep to the   transverse carpal ligament and the canal was prepped for the endoscope by placing    increasingly larger trocars x2.  Next, the endoscope was placed deep to the    transverse carpal ligament, which was visualized clearly.  The distal aspect    of the transverse carpal ligament was then incised and this was visualized to    ensure complete transection distally.  Once this was ensured, the blade was    then maintained elevated and the endoscope was brought proximally across the  transverse carpal ligament.  The entirety of the transverse carpal ligament was incised and the endoscope was removed.     The wound was irrigated with normal saline and closed with 4-0 nylon in a    horizontal mattress fashion.  The wound was then dressed with Xeroform, 4x4 gauze and a tegaderm dressing.  The tourniquet was deflated.  The patient    was subsequently taken to the PACU in good and stable condition.     DISPOSITION:  Plan for discharge to home on a regular diet.  Weightbearing  as tolerated to the operative extremity with the exception of a 10-pound    lifting restriction.  Bandages may be removed after 48 hours.  At that time may   begin showering and bathing as normal, but should not soak the wound.    Return to clinic in 10-14 days.  A prescription for tramadol 50 mg 1 tab  PO q4-6 hours p.r.n. pain.

## 2024-12-18 NOTE — ANESTHESIA TIME REPORT
Anesthesia Start and Stop Event Times       Date Time Event    12/18/2024 1517 Ready for Procedure     1522 Anesthesia Start     1543 Anesthesia Stop          Responsible Staff  12/18/24      Name Role Begin End    Bradly Rivas M.D. Anesth 1522 1543          Overtime Reason:  overtime    Comments:

## 2024-12-18 NOTE — ANESTHESIA PREPROCEDURE EVALUATION
Case: [id number] Date/Time: 24 1400    Procedure: RIGHT ENDOSCOPIC CARPAL TUNNEL RELEASE    Pre-op diagnosis: CARPAL TUNNEL SYNDROME OF RIGHT WRIST    Location:  OR 02 / SURGERY [facility]    Surgeons: Mike Carrion M.D.            Relevant Problems   ANESTHESIA (within normal limits)      PULMONARY   (positive) Mild intermittent asthma without complication      NEURO (within normal limits)      CARDIAC (within normal limits)      GI (within normal limits)       (within normal limits)      ENDO   (positive) Hypothyroidism due to Hashimoto's thyroiditis      Other   (positive) Arthritis   (positive) Dyslipidemia     Lab Results   Component Value Date/Time    WBC 8.0 2022 08:50 AM    RBC 4.61 2022 08:50 AM    HEMOGLOBIN 14.8 2022 08:50 AM    HEMATOCRIT 43.7 2022 08:50 AM    MCV 94.8 2022 08:50 AM    MCH 32.1 2022 08:50 AM    MCHC 33.9 2022 08:50 AM    RDW 44.5 2022 08:50 AM    PLATELETCT 318 2022 08:50 AM    MPV 11.6 2022 08:50 AM    NEUTSPOLYS 44.40 2022 08:50 AM    LYMPHOCYTES 44.40 (H) 2022 08:50 AM    MONOCYTES 6.80 2022 08:50 AM    EOSINOPHILS 3.30 2022 08:50 AM    BASOPHILS 0.80 2022 08:50 AM        Lab Results   Component Value Date/Time    SODIUM 138 12/10/2024 01:42 PM    POTASSIUM 3.7 12/10/2024 01:42 PM    CHLORIDE 99 12/10/2024 01:42 PM    CO2 27 12/10/2024 01:42 PM    GLUCOSE 162 (H) 12/10/2024 01:42 PM    BUN 11 12/10/2024 01:42 PM    CREATININE 0.97 12/10/2024 01:42 PM    GFR 67    EKG  Results for orders placed or performed in visit on 12/10/24   ECG   Result Value Ref Range    Report       [facility]    Test Date:  2024-12-10  Pt Name:    RIKI SERRANO                 Department: [facility]  MRN:        [id number]                      Room:  Gender:     Female                       Technician: ELIZABETH  :        [id number]                   Requested By:MIKE CARRION  Order #:    [id number]                     Reading MD: Manda Ferro MD    Measurements  Intervals                                Axis  Rate:       66                           P:          58  MI:         176                          QRS:        48  QRSD:       94                           T:          38  QT:         368  QTc:        386    Interpretive Statements  Sinus rhythm  No previous ECG available for comparison  Electronically Signed On 12- 14:15:05 PST by Manda Ferro MD          Physical Exam    Airway   Mallampati: II  TM distance: >3 FB  Neck ROM: full       Cardiovascular - normal exam  Rhythm: regular  Rate: normal     Dental - normal exam           Pulmonary - normal exam  Breath sounds clear to auscultation     Abdominal    Neurological - normal exam                   Anesthesia Plan    ASA 2       Plan - MAC               Induction: intravenous    Postoperative Plan: Postoperative administration of opioids is intended.    Pertinent diagnostic labs and testing reviewed    Informed Consent:    Anesthetic plan and risks discussed with patient.    Use of blood products discussed with: patient whom consented to blood products.       Risks of MAC/general anesthesia discussed including sore throat, damage to lips/teeth, anaphylaxis/drug reaction, aspiration, awareness, post-op nausea/vomiting, post-op delirium, myocardial infarction, cerebral vascular accident up to and including death.

## 2024-12-19 NOTE — ANESTHESIA POSTPROCEDURE EVALUATION
Patient: Ester Aden    Procedure Summary       Date: 12/18/24 Room / Location:  OR 02 / SURGERY [facility]    Anesthesia Start: 1522 Anesthesia Stop: 1543    Procedure: RIGHT ENDOSCOPIC CARPAL TUNNEL RELEASE (Right: Wrist) Diagnosis: (CARPAL TUNNEL SYNDROME OF RIGHT WRIST)    Surgeons: Mike Sweet M.D. Responsible Provider: Bradly Rivas M.D.    Anesthesia Type: MAC ASA Status: 2            Final Anesthesia Type: MAC  Last vitals  BP   Blood Pressure: 101/47    Temp   36.5 °C (97.7 °F)    Pulse   (!) 59   Resp   16    SpO2   95 %      Anesthesia Post Evaluation    Patient location during evaluation: PACU  Patient participation: complete - patient participated  Level of consciousness: awake and alert  Pain score: 0    Airway patency: patent  Anesthetic complications: no  Cardiovascular status: hemodynamically stable  Respiratory status: acceptable  Hydration status: euvolemic    PONV: none          No notable events documented.     Nurse Pain Score: 0 (NPRS)

## 2024-12-19 NOTE — OR NURSING
1633: Patient arrived to phase II from PACU 1 via gurney. Report received from RN. Respirations are spontaneous and unlabored. VSS on RA. Dressing is CDI. Pulse is 2+, cap refill less than 3 seconds, warm.    1650: Family at bedside.     1700: Patient education completed, family denies further questions. IV removed with tip intact. DC'd to care of family post uneventful stay in PACU 2.

## 2024-12-19 NOTE — DISCHARGE INSTRUCTIONS
Dr. Sweet Instructions:   -Keep dressings clean, dry and intact   -No lifting anything heavier than 10 lbs with the operative hand   -May remove dressings after 48 hrs   -May begin showering and washing hands as normal after 48 hrs   -Do not soak the wound   -Keep hand elevated and apply ice as much as possible in the first three days   -Do not operate a vehicle or machinery while taking narcotic pain medications   -Return to clinic in 10-14 days   -Please call the office with any questions [phone]     What to Expect Post Anesthesia    Rest and take it easy for the first 24 hours.  A responsible adult is recommended to remain with you during that time.  It is normal to feel sleepy.  We encourage you to not do anything that requires balance, judgment or coordination.    FOR 24 HOURS DO NOT:  Drive, operate machinery or run household appliances.  Drink beer or alcoholic beverages.  Make important decisions or sign legal documents.    To avoid nausea, slowly advance diet as tolerated, avoiding spicy or greasy foods for the first day.  Add more substantial food to your diet according to your provider's instructions.  INCREASE FLUIDS AND FIBER TO AVOID CONSTIPATION.    MILD FLU-LIKE SYMPTOMS ARE NORMAL.  YOU MAY EXPERIENCE GENERALIZED MUSCLE ACHES, THROAT IRRITATION, HEADACHE AND/OR SOME NAUSEA.    If any questions arise, call your provider.  If your provider is not available, please feel free to call the Surgical Center at [phone].    MEDICATIONS: Resume taking daily medication.  Take prescribed pain medication with food.  If no medication is prescribed, you may take non-aspirin pain medication if needed.  PAIN MEDICATION CAN BE VERY CONSTIPATING.  Take a stool softener or laxative such as senokot, pericolace, or milk of magnesia if needed.    Last pain medication given at None Given in Recovery

## 2025-01-03 ENCOUNTER — APPOINTMENT (OUTPATIENT)
Dept: URBAN - METROPOLITAN AREA CLINIC 22 | Facility: CLINIC | Age: 60
Setting detail: DERMATOLOGY
End: 2025-01-03

## 2025-01-03 DIAGNOSIS — L72.0 EPIDERMAL CYST: ICD-10-CM

## 2025-01-03 PROBLEM — C44.612 BASAL CELL CARCINOMA OF SKIN OF RIGHT UPPER LIMB, INCLUDING SHOULDER: Status: ACTIVE | Noted: 2025-01-03

## 2025-01-03 PROCEDURE — 17262 DSTRJ MAL LES T/A/L 1.1-2.0: CPT

## 2025-01-03 PROCEDURE — ? CURETTAGE AND DESTRUCTION

## 2025-01-03 PROCEDURE — ? EXTRACTIONS

## 2025-01-03 PROCEDURE — 99212 OFFICE O/P EST SF 10 MIN: CPT | Mod: 25

## 2025-01-03 ASSESSMENT — LOCATION ZONE DERM: LOCATION ZONE: EYELID

## 2025-01-03 ASSESSMENT — LOCATION SIMPLE DESCRIPTION DERM: LOCATION SIMPLE: LEFT SUPERIOR EYELID

## 2025-01-03 ASSESSMENT — LOCATION DETAILED DESCRIPTION DERM: LOCATION DETAILED: LEFT MEDIAL SUPERIOR EYELID

## 2025-01-03 NOTE — PROCEDURE: EXTRACTIONS
Consent was obtained and risks were reviewed including but not limited to scarring, infection, bleeding, scabbing, incomplete removal, and allergy to anesthesia.
Render Post-Care Instructions In Note?: no
Extraction Method: sterile needle
Post-Care Instructions: I reviewed with the patient in detail post-care instructions. Patient is to keep the treatment areas dry overnight, and then apply bacitracin twice daily until healed. Patient may apply hydrogen peroxide soaks to remove any crusting.
Detail Level: Detailed
Acne Type: Comedonal Lesions
Prep Text (Optional): Prior to removal the treatment areas were prepped in the usual fashion.

## 2025-02-04 ENCOUNTER — APPOINTMENT (OUTPATIENT)
Dept: URBAN - METROPOLITAN AREA CLINIC 22 | Facility: CLINIC | Age: 60
Setting detail: DERMATOLOGY
End: 2025-02-04

## 2025-02-04 DIAGNOSIS — L57.0 ACTINIC KERATOSIS: ICD-10-CM

## 2025-02-04 PROBLEM — D48.5 NEOPLASM OF UNCERTAIN BEHAVIOR OF SKIN: Status: ACTIVE | Noted: 2025-02-04

## 2025-02-04 PROCEDURE — 17000 DESTRUCT PREMALG LESION: CPT

## 2025-02-04 PROCEDURE — ? LIQUID NITROGEN

## 2025-02-04 PROCEDURE — ? ADDITIONAL NOTES

## 2025-02-04 PROCEDURE — ? COUNSELING

## 2025-02-04 ASSESSMENT — LOCATION SIMPLE DESCRIPTION DERM: LOCATION SIMPLE: RIGHT CALF

## 2025-02-04 ASSESSMENT — LOCATION ZONE DERM: LOCATION ZONE: LEG

## 2025-02-04 ASSESSMENT — LOCATION DETAILED DESCRIPTION DERM: LOCATION DETAILED: RIGHT DISTAL CALF

## 2025-02-04 NOTE — PROCEDURE: ADDITIONAL NOTES
Render Risk Assessment In Note?: no
Detail Level: Simple
Additional Notes: Pt has had for about 1 month, and describes it as itchy and new. Discussed we can tx w/ LN2 vs Biopsy. If lesion persists we will bx in May.

## 2025-02-04 NOTE — PROCEDURE: LIQUID NITROGEN
Detail Level: Detailed
Duration Of Freeze Thaw-Cycle (Seconds): 3
Post-Care Instructions: I reviewed with the patient in detail post-care instructions. Patient is to wear sunprotection, and avoid picking at any of the treated lesions. Pt may apply Vaseline to crusted or scabbing areas.
Render Note In Bullet Format When Appropriate: No
Number Of Freeze-Thaw Cycles: 2 freeze-thaw cycles
Show Applicator Variable?: Yes
Consent: The patient's consent was obtained including but not limited to risks of crusting, scabbing, blistering, scarring, darker or lighter pigmentary change, recurrence, incomplete removal and infection.

## 2025-05-21 ENCOUNTER — APPOINTMENT (OUTPATIENT)
Dept: URBAN - METROPOLITAN AREA CLINIC 22 | Facility: CLINIC | Age: 60
Setting detail: DERMATOLOGY
End: 2025-05-21

## 2025-05-21 DIAGNOSIS — L57.0 ACTINIC KERATOSIS: ICD-10-CM | Status: INADEQUATELY CONTROLLED

## 2025-05-21 DIAGNOSIS — D22 MELANOCYTIC NEVI: ICD-10-CM

## 2025-05-21 DIAGNOSIS — Z86.007 PERSONAL HISTORY OF IN-SITU NEOPLASM OF SKIN: ICD-10-CM

## 2025-05-21 DIAGNOSIS — L85.1 ACQUIRED KERATOSIS [KERATODERMA] PALMARIS ET PLANTARIS: ICD-10-CM

## 2025-05-21 DIAGNOSIS — L82.1 OTHER SEBORRHEIC KERATOSIS: ICD-10-CM

## 2025-05-21 DIAGNOSIS — L81.4 OTHER MELANIN HYPERPIGMENTATION: ICD-10-CM

## 2025-05-21 DIAGNOSIS — D18.0 HEMANGIOMA: ICD-10-CM

## 2025-05-21 DIAGNOSIS — Z71.89 OTHER SPECIFIED COUNSELING: ICD-10-CM

## 2025-05-21 PROBLEM — D22.5 MELANOCYTIC NEVI OF TRUNK: Status: ACTIVE | Noted: 2025-05-21

## 2025-05-21 PROBLEM — D18.01 HEMANGIOMA OF SKIN AND SUBCUTANEOUS TISSUE: Status: ACTIVE | Noted: 2025-05-21

## 2025-05-21 PROCEDURE — ? LIQUID NITROGEN

## 2025-05-21 PROCEDURE — ? ADDITIONAL NOTES

## 2025-05-21 PROCEDURE — ? COUNSELING

## 2025-05-21 PROCEDURE — 99213 OFFICE O/P EST LOW 20 MIN: CPT | Mod: 25

## 2025-05-21 PROCEDURE — ? SUNSCREEN RECOMMENDATIONS

## 2025-05-21 PROCEDURE — 17000 DESTRUCT PREMALG LESION: CPT

## 2025-05-21 ASSESSMENT — LOCATION SIMPLE DESCRIPTION DERM
LOCATION SIMPLE: LEFT FOREHEAD
LOCATION SIMPLE: LEFT FOREARM
LOCATION SIMPLE: RIGHT POSTERIOR UPPER ARM
LOCATION SIMPLE: ABDOMEN
LOCATION SIMPLE: RIGHT UPPER BACK
LOCATION SIMPLE: LEFT UPPER BACK

## 2025-05-21 ASSESSMENT — LOCATION ZONE DERM
LOCATION ZONE: ARM
LOCATION ZONE: FACE
LOCATION ZONE: TRUNK

## 2025-05-21 ASSESSMENT — LOCATION DETAILED DESCRIPTION DERM
LOCATION DETAILED: RIGHT PROXIMAL LATERAL POSTERIOR UPPER ARM
LOCATION DETAILED: RIGHT MID-UPPER BACK
LOCATION DETAILED: LEFT INFERIOR FOREHEAD
LOCATION DETAILED: LEFT LATERAL ABDOMEN
LOCATION DETAILED: LEFT PROXIMAL DORSAL FOREARM
LOCATION DETAILED: LEFT INFERIOR UPPER BACK

## 2025-05-21 NOTE — PROCEDURE: LIQUID NITROGEN
Detail Level: Detailed
Render Post-Care Instructions In Note?: no
Show Aperture Variable?: Yes
Number Of Freeze-Thaw Cycles: 2 freeze-thaw cycles
Post-Care Instructions: I reviewed with the patient in detail post-care instructions. Patient is to wear sunprotection, and avoid picking at any of the treated lesions. Pt may apply Vaseline to crusted or scabbing areas.
Duration Of Freeze Thaw-Cycle (Seconds): 3
Consent: The patient's consent was obtained including but not limited to risks of crusting, scabbing, blistering, scarring, darker or lighter pigmentary change, recurrence, incomplete removal and infection.

## 2025-05-21 NOTE — PROCEDURE: ADDITIONAL NOTES
Detail Level: Simple
Additional Notes: 5/21/25: stable. \\n\\nPrior: Pt has been using CeraVe SA and skin is somewhat softer today. Discussed La Roche Posay cream with Urea or trial of a 40% OTC Urea. \\nPt reports that she has undergone testing on thyroid, lungs, and blood work and all results are negative. She has had imaging to r/o malignancy. \\nShe states she did speak with her mother and she may also have similar on her hands and feet, suggesting possible hereditary vs acquired as previously thought.  She will continue with regular screenings with her PCP.
Render Risk Assessment In Note?: no

## (undated) DEVICE — SODIUM CHL IRRIGATION 0.9% 1000ML (12EA/CA)

## (undated) DEVICE — PACK GYN DAVINCI (2EA/CA)

## (undated) DEVICE — GLOVE BIOGEL PI INDICATOR SZ 8.0 SURGICAL PF LF -(50/BX 4BX/CA)

## (undated) DEVICE — SET SUCTION/IRRIGATION WITH DISPOSABLE TIP (6/CA )PART #0250-070-520 IS A SUB

## (undated) DEVICE — SET EXTENSION WITH 2 PORTS (48EA/CA) ***PART #2C8610 IS A SUBSTITUTE*****

## (undated) DEVICE — INSTRUMENT ENDOSCOPIC CENTERLINE RELEASE

## (undated) DEVICE — GLOVE BIOGEL PI INDICATOR SZ 7.0 SURGICAL PF LF - (50/BX 4BX/CA)

## (undated) DEVICE — GLOVE SZ 7.5 BIOGEL PI MICRO - PF LF (50PR/BX)

## (undated) DEVICE — PAD OR TABLE DA VINCI 2IN X 20IN X 72IN - (12EA/CA)

## (undated) DEVICE — SUTURE GENERAL

## (undated) DEVICE — GLOVE BIOGEL SZ 6.5 SURGICAL PF LTX (50PR/BX 4BX/CA)

## (undated) DEVICE — Device

## (undated) DEVICE — GLOVE SZ 6.5 BIOGEL PI MICRO - PF LF (50PR/BX)

## (undated) DEVICE — SUTURE 3-0 MONOCRYL SH (36PK/BX)

## (undated) DEVICE — SUCTION INSTRUMENT YANKAUER BULBOUS TIP W/O VENT (50EA/CA)

## (undated) DEVICE — WATER IRRIG. STER 3000 ML - (4/CA)

## (undated) DEVICE — ELECTRODE DUAL RETURN W/ CORD - (50/PK)

## (undated) DEVICE — GLOVE BIOGEL INDICATOR SZ 8 SURGICAL PF LTX - (50/BX 4BX/CA)

## (undated) DEVICE — SUTURE 0 VICRYL PLUS CT-2 - 27 INCH (36/BX)

## (undated) DEVICE — SYSTEM CONTAINED EXTRACTION W/BALLOON BLUNT TIP TROCAR 14CM (1/EA)

## (undated) DEVICE — PACK TRENGUARD 450 PROCEDURE (12EA/CA)

## (undated) DEVICE — BLADE SURGICAL #11 - (50/BX)

## (undated) DEVICE — NEEDLE INSFL 120MM 14GA VRRS - (20/BX)

## (undated) DEVICE — TUBE CONNECT SUCTION CLEAR 120 X 1/4" (50EA/CA)"

## (undated) DEVICE — SUTURE 4-0 ETHILON PS-2 18 (12PK/BX)"

## (undated) DEVICE — DRESSING TRANSPARENT FILM TEGADERM 2.375 X 2.75" (100EA/BX)"

## (undated) DEVICE — GOWN WARMING STANDARD FLEX - (30/CA)

## (undated) DEVICE — PAD PREP 24 X 48 CUFFED - (100/CA)

## (undated) DEVICE — CANISTER SUCTION 3000ML MECHANICAL FILTER AUTO SHUTOFF MEDI-VAC NONSTERILE LF DISP  (40EA/CA)

## (undated) DEVICE — SLEEVE, VASO, THIGH, MED

## (undated) DEVICE — DRAPE COLUMN  BOX OF 20

## (undated) DEVICE — SEAL 5MM-8MM UNIVERSAL  BOX OF 10

## (undated) DEVICE — SET IRRIGATION CYSTOSCOPY TUBE L80 IN (20EA/CA)

## (undated) DEVICE — SET LEADWIRE 5 LEAD BEDSIDE DISPOSABLE ECG (1SET OF 5/EA)

## (undated) DEVICE — GOWN SURGEONS X-LARGE - DISP. (30/CA)

## (undated) DEVICE — GLOVE BIOGEL PI INDICATOR SZ 6.5 SURGICAL PF LF - (50/BX 4BX/CA)

## (undated) DEVICE — BLADE BEAVER 6400 MINI EYE ROUND TIP SHARP ON ONE SIDE (20/CA)

## (undated) DEVICE — HEAD HOLDER JUNIOR/ADULT

## (undated) DEVICE — PAD SANITARY 11IN MAXI IND WRAPPED  (12EA/PK 24PK/CA)

## (undated) DEVICE — ELECTRODE 850 FOAM ADHESIVE - HYDROGEL RADIOTRNSPRNT (50/PK)

## (undated) DEVICE — PROTECTOR ULNA NERVE - (36PR/CA)

## (undated) DEVICE — PACK UPPER EXTREMITY SM OR - (3/CA)

## (undated) DEVICE — SUTURE 0 PDS CT-2 (36PK/BX)

## (undated) DEVICE — KIT ANESTHESIA W/CIRCUIT & 3/LT BAG W/FILTER (20EA/CA)

## (undated) DEVICE — FORCEP BIPOLAR MARYLAND DA VINCI 10X'S REUSABLE (10UN/EA)

## (undated) DEVICE — ANTI-FOG SOLUTION - 60BTL/CA

## (undated) DEVICE — GLOVE BIOGEL SZ 8 SURGICAL PF LTX - (50PR/BX 4BX/CA)

## (undated) DEVICE — KIT ROOM DECONTAMINATION

## (undated) DEVICE — GLOVE BIOGEL PI ORTHO SZ 6 SURGICAL PF LF (40PR/BX)

## (undated) DEVICE — ARMREST CRADLE FOAM - (2PR/PK 12PR/CA)

## (undated) DEVICE — DERMABOND ADVANCED - (12EA/BX)

## (undated) DEVICE — LACTATED RINGERS INJ 1000 ML - (14EA/CA 60CA/PF)

## (undated) DEVICE — TUBING CLEARLINK DUO-VENT - C-FLO (48EA/CA)

## (undated) DEVICE — DRAPE ARM  BOX OF 20

## (undated) DEVICE — NEPTUNE 4 PORT MANIFOLD - (20/PK)

## (undated) DEVICE — CHLORAPREP 26 ML APPLICATOR - ORANGE TINT(25/CA)

## (undated) DEVICE — OBTURATOR BLADELESS STANDARD 8MM (6EA/BX)

## (undated) DEVICE — ROBOTIC SURGERY SERVICES

## (undated) DEVICE — MASK ANESTHESIA ADULT  - (100/CA)

## (undated) DEVICE — WATER IRRIGATION STERILE 1000ML (12EA/CA)

## (undated) DEVICE — GLOVE BIOGEL PI ORTHO SZ 6 1/2 SURGICAL PF LF (40PR/BX)

## (undated) DEVICE — SENSOR SPO2 NEO LNCS ADHESIVE (20/BX) SEE USER NOTES